# Patient Record
Sex: FEMALE | Race: BLACK OR AFRICAN AMERICAN | Employment: OTHER | URBAN - METROPOLITAN AREA
[De-identification: names, ages, dates, MRNs, and addresses within clinical notes are randomized per-mention and may not be internally consistent; named-entity substitution may affect disease eponyms.]

---

## 2017-01-24 RX ORDER — FLUCONAZOLE 100 MG/1
100 TABLET ORAL DAILY
Qty: 7 TABLET | Refills: 0 | Status: SHIPPED | OUTPATIENT
Start: 2017-01-24 | End: 2017-01-31

## 2017-01-24 RX ORDER — METRONIDAZOLE 500 MG/1
500 TABLET ORAL 2 TIMES DAILY
Qty: 14 TABLET | Refills: 0 | Status: SHIPPED | OUTPATIENT
Start: 2017-01-24 | End: 2017-01-31

## 2017-04-24 ENCOUNTER — HOSPITAL ENCOUNTER (OUTPATIENT)
Age: 52
Setting detail: SPECIMEN
Discharge: HOME | End: 2017-04-24
Payer: COMMERCIAL

## 2017-04-24 ENCOUNTER — OFFICE VISIT (OUTPATIENT)
Dept: OBGYN CLINIC | Age: 52
End: 2017-04-24
Payer: COMMERCIAL

## 2017-04-24 VITALS
DIASTOLIC BLOOD PRESSURE: 88 MMHG | SYSTOLIC BLOOD PRESSURE: 123 MMHG | HEIGHT: 64 IN | BODY MASS INDEX: 30.56 KG/M2 | HEART RATE: 85 BPM | RESPIRATION RATE: 18 BRPM | WEIGHT: 179 LBS

## 2017-04-24 DIAGNOSIS — E28.39 HYPOESTROGENISM: ICD-10-CM

## 2017-04-24 DIAGNOSIS — Z01.419 WELL WOMAN EXAM: Primary | ICD-10-CM

## 2017-04-24 DIAGNOSIS — N76.0 ACUTE VAGINITIS: ICD-10-CM

## 2017-04-24 DIAGNOSIS — Z12.31 VISIT FOR SCREENING MAMMOGRAM: ICD-10-CM

## 2017-04-24 DIAGNOSIS — Z12.4 PAP SMEAR FOR CERVICAL CANCER SCREENING: ICD-10-CM

## 2017-04-24 PROCEDURE — 99396 PREV VISIT EST AGE 40-64: CPT | Performed by: SPECIALIST

## 2017-04-24 RX ORDER — METRONIDAZOLE 500 MG/1
500 TABLET ORAL 2 TIMES DAILY
Qty: 14 TABLET | Refills: 0 | Status: SHIPPED | OUTPATIENT
Start: 2017-04-24 | End: 2017-05-01

## 2017-04-24 RX ORDER — FLUCONAZOLE 100 MG/1
100 TABLET ORAL DAILY
Qty: 7 TABLET | Refills: 0 | Status: SHIPPED | OUTPATIENT
Start: 2017-04-24 | End: 2017-05-01

## 2017-04-24 ASSESSMENT — ENCOUNTER SYMPTOMS
COUGH: 0
ABDOMINAL PAIN: 0
DIARRHEA: 0
VOMITING: 0
EYE PAIN: 0
ROS SKIN COMMENTS: HAIR LOSS
CONSTIPATION: 0
NAUSEA: 0
ABDOMINAL DISTENTION: 0
APNEA: 0

## 2017-04-27 LAB — CYTOLOGY REPORT: NORMAL

## 2017-07-20 ENCOUNTER — HOSPITAL ENCOUNTER (OUTPATIENT)
Age: 52
Discharge: HOME | End: 2017-07-20
Payer: COMMERCIAL

## 2017-07-20 ENCOUNTER — HOSPITAL ENCOUNTER (OUTPATIENT)
Dept: GENERAL RADIOLOGY | Age: 52
Discharge: HOME | End: 2017-07-20
Payer: COMMERCIAL

## 2017-07-20 ENCOUNTER — HOSPITAL ENCOUNTER (OUTPATIENT)
Dept: MAMMOGRAPHY | Age: 52
Discharge: HOME | End: 2017-07-20
Payer: COMMERCIAL

## 2017-07-20 DIAGNOSIS — R52 PAIN: ICD-10-CM

## 2017-07-20 DIAGNOSIS — Z12.31 VISIT FOR SCREENING MAMMOGRAM: ICD-10-CM

## 2017-07-20 LAB
ABSOLUTE EOS #: 0.2 K/UL (ref 0–0.4)
ABSOLUTE LYMPH #: 3.3 K/UL (ref 1–4.8)
ABSOLUTE MONO #: 0.6 K/UL (ref 0.2–0.8)
ALBUMIN SERPL-MCNC: 4.3 G/DL (ref 3.5–5.2)
ALBUMIN/GLOBULIN RATIO: ABNORMAL (ref 1–2.5)
ALP BLD-CCNC: 82 U/L (ref 35–104)
ALT SERPL-CCNC: 18 U/L (ref 5–33)
ANION GAP SERPL CALCULATED.3IONS-SCNC: 11 MMOL/L (ref 9–17)
AST SERPL-CCNC: 19 U/L
BASOPHILS # BLD: 1 %
BASOPHILS ABSOLUTE: 0.1 K/UL (ref 0–0.2)
BILIRUB SERPL-MCNC: 0.24 MG/DL (ref 0.3–1.2)
BUN BLDV-MCNC: 12 MG/DL (ref 6–20)
BUN/CREAT BLD: 14 (ref 9–20)
CALCIUM SERPL-MCNC: 9.6 MG/DL (ref 8.6–10.4)
CHLORIDE BLD-SCNC: 102 MMOL/L (ref 98–107)
CHOLESTEROL, FASTING: 243 MG/DL
CHOLESTEROL/HDL RATIO: 4.2
CO2: 30 MMOL/L (ref 20–31)
CREAT SERPL-MCNC: 0.85 MG/DL (ref 0.5–0.9)
DIFFERENTIAL TYPE: ABNORMAL
EOSINOPHILS RELATIVE PERCENT: 2 %
GFR AFRICAN AMERICAN: >60 ML/MIN
GFR NON-AFRICAN AMERICAN: >60 ML/MIN
GFR SERPL CREATININE-BSD FRML MDRD: ABNORMAL ML/MIN/{1.73_M2}
GFR SERPL CREATININE-BSD FRML MDRD: ABNORMAL ML/MIN/{1.73_M2}
GLUCOSE FASTING: 109 MG/DL (ref 70–99)
HCT VFR BLD CALC: 43.4 % (ref 36–46)
HDLC SERPL-MCNC: 58 MG/DL
HEMOGLOBIN: 14.1 G/DL (ref 12–16)
LDL CHOLESTEROL: 152 MG/DL (ref 0–130)
LYMPHOCYTES # BLD: 36 %
MCH RBC QN AUTO: 28.4 PG (ref 26–34)
MCHC RBC AUTO-ENTMCNC: 32.5 G/DL (ref 31–37)
MCV RBC AUTO: 87.5 FL (ref 80–100)
MONOCYTES # BLD: 7 %
PDW BLD-RTO: 15 % (ref 11.5–14.5)
PLATELET # BLD: 391 K/UL (ref 130–400)
PLATELET ESTIMATE: ABNORMAL
PMV BLD AUTO: 7.7 FL (ref 6–12)
POTASSIUM SERPL-SCNC: 3.9 MMOL/L (ref 3.7–5.3)
RBC # BLD: 4.96 M/UL (ref 4–5.2)
RBC # BLD: ABNORMAL 10*6/UL
SEG NEUTROPHILS: 54 %
SEGMENTED NEUTROPHILS ABSOLUTE COUNT: 4.9 K/UL (ref 1.8–7.7)
SODIUM BLD-SCNC: 143 MMOL/L (ref 135–144)
TOTAL PROTEIN: 7.3 G/DL (ref 6.4–8.3)
TRIGLYCERIDE, FASTING: 163 MG/DL
TSH SERPL DL<=0.05 MIU/L-ACNC: 1.33 MIU/L (ref 0.3–5)
VLDLC SERPL CALC-MCNC: ABNORMAL MG/DL (ref 1–30)
WBC # BLD: 9 K/UL (ref 3.5–11)
WBC # BLD: ABNORMAL 10*3/UL

## 2017-07-20 PROCEDURE — 77063 BREAST TOMOSYNTHESIS BI: CPT

## 2017-07-20 PROCEDURE — 72100 X-RAY EXAM L-S SPINE 2/3 VWS: CPT

## 2017-07-20 PROCEDURE — 80061 LIPID PANEL: CPT

## 2017-07-20 PROCEDURE — G0202 SCR MAMMO BI INCL CAD: HCPCS

## 2017-07-20 PROCEDURE — 84443 ASSAY THYROID STIM HORMONE: CPT

## 2017-07-20 PROCEDURE — 80053 COMPREHEN METABOLIC PANEL: CPT

## 2017-07-20 PROCEDURE — 85025 COMPLETE CBC W/AUTO DIFF WBC: CPT

## 2017-07-20 PROCEDURE — 36415 COLL VENOUS BLD VENIPUNCTURE: CPT

## 2017-08-10 ENCOUNTER — OFFICE VISIT (OUTPATIENT)
Dept: FAMILY MEDICINE CLINIC | Age: 52
End: 2017-08-10
Payer: COMMERCIAL

## 2017-08-10 VITALS
BODY MASS INDEX: 30.22 KG/M2 | WEIGHT: 177 LBS | HEIGHT: 64 IN | TEMPERATURE: 98.5 F | HEART RATE: 110 BPM | DIASTOLIC BLOOD PRESSURE: 80 MMHG | OXYGEN SATURATION: 98 % | SYSTOLIC BLOOD PRESSURE: 120 MMHG

## 2017-08-10 DIAGNOSIS — K21.9 GASTROESOPHAGEAL REFLUX DISEASE WITHOUT ESOPHAGITIS: ICD-10-CM

## 2017-08-10 DIAGNOSIS — I10 ESSENTIAL HYPERTENSION: Primary | ICD-10-CM

## 2017-08-10 DIAGNOSIS — E78.5 HYPERLIPIDEMIA, UNSPECIFIED HYPERLIPIDEMIA TYPE: ICD-10-CM

## 2017-08-10 DIAGNOSIS — F32.A DEPRESSION, UNSPECIFIED DEPRESSION TYPE: ICD-10-CM

## 2017-08-10 DIAGNOSIS — Z13.31 POSITIVE DEPRESSION SCREENING: ICD-10-CM

## 2017-08-10 PROCEDURE — 96127 BRIEF EMOTIONAL/BEHAV ASSMT: CPT | Performed by: FAMILY MEDICINE

## 2017-08-10 PROCEDURE — 99203 OFFICE O/P NEW LOW 30 MIN: CPT | Performed by: FAMILY MEDICINE

## 2017-08-10 PROCEDURE — G8431 POS CLIN DEPRES SCRN F/U DOC: HCPCS | Performed by: FAMILY MEDICINE

## 2017-08-10 RX ORDER — PAROXETINE HYDROCHLORIDE 25 MG/1
TABLET, FILM COATED, EXTENDED RELEASE ORAL
Refills: 0 | COMMUNITY
Start: 2017-07-06 | End: 2018-09-27 | Stop reason: SDUPTHER

## 2017-08-10 RX ORDER — OMEPRAZOLE 40 MG/1
40 CAPSULE, DELAYED RELEASE ORAL 2 TIMES DAILY
Refills: 0 | COMMUNITY
Start: 2017-07-24 | End: 2022-03-14

## 2017-08-10 RX ORDER — AMLODIPINE BESYLATE 10 MG/1
TABLET ORAL
Refills: 0 | COMMUNITY
Start: 2017-07-18 | End: 2018-02-05 | Stop reason: SDUPTHER

## 2017-08-10 RX ORDER — PRAVASTATIN SODIUM 20 MG
20 TABLET ORAL EVERY EVENING
Qty: 30 TABLET | Refills: 3 | Status: SHIPPED | OUTPATIENT
Start: 2017-08-10 | End: 2017-12-11 | Stop reason: SDUPTHER

## 2017-08-10 RX ORDER — MIRTAZAPINE 30 MG/1
TABLET, FILM COATED ORAL
Refills: 0 | COMMUNITY
Start: 2017-08-07 | End: 2018-04-25

## 2017-08-10 ASSESSMENT — ENCOUNTER SYMPTOMS
COUGH: 0
ABDOMINAL PAIN: 0
SORE THROAT: 0
SHORTNESS OF BREATH: 0
NAUSEA: 0

## 2017-08-10 ASSESSMENT — PATIENT HEALTH QUESTIONNAIRE - PHQ9
7. TROUBLE CONCENTRATING ON THINGS, SUCH AS READING THE NEWSPAPER OR WATCHING TELEVISION: 3
SUM OF ALL RESPONSES TO PHQ9 QUESTIONS 1 & 2: 4
2. FEELING DOWN, DEPRESSED OR HOPELESS: 2
3. TROUBLE FALLING OR STAYING ASLEEP: 3
5. POOR APPETITE OR OVEREATING: 3
4. FEELING TIRED OR HAVING LITTLE ENERGY: 3
1. LITTLE INTEREST OR PLEASURE IN DOING THINGS: 2
SUM OF ALL RESPONSES TO PHQ QUESTIONS 1-9: 21
10. IF YOU CHECKED OFF ANY PROBLEMS, HOW DIFFICULT HAVE THESE PROBLEMS MADE IT FOR YOU TO DO YOUR WORK, TAKE CARE OF THINGS AT HOME, OR GET ALONG WITH OTHER PEOPLE: 1
9. THOUGHTS THAT YOU WOULD BE BETTER OFF DEAD, OR OF HURTING YOURSELF: 0
6. FEELING BAD ABOUT YOURSELF - OR THAT YOU ARE A FAILURE OR HAVE LET YOURSELF OR YOUR FAMILY DOWN: 3
8. MOVING OR SPEAKING SO SLOWLY THAT OTHER PEOPLE COULD HAVE NOTICED. OR THE OPPOSITE, BEING SO FIGETY OR RESTLESS THAT YOU HAVE BEEN MOVING AROUND A LOT MORE THAN USUAL: 2

## 2017-10-03 ENCOUNTER — TELEPHONE (OUTPATIENT)
Dept: OBGYN CLINIC | Age: 52
End: 2017-10-03

## 2017-10-03 DIAGNOSIS — N89.8 VAGINA ITCHING: Primary | ICD-10-CM

## 2017-10-04 RX ORDER — FLUCONAZOLE 100 MG/1
100 TABLET ORAL DAILY
Qty: 7 TABLET | Refills: 0 | Status: SHIPPED | OUTPATIENT
Start: 2017-10-04 | End: 2017-10-11

## 2017-11-03 ENCOUNTER — HOSPITAL ENCOUNTER (OUTPATIENT)
Age: 52
Discharge: HOME | End: 2017-11-03
Payer: COMMERCIAL

## 2017-11-03 ENCOUNTER — HOSPITAL ENCOUNTER (OUTPATIENT)
Dept: GENERAL RADIOLOGY | Age: 52
Discharge: HOME | End: 2017-11-03
Payer: COMMERCIAL

## 2017-11-03 DIAGNOSIS — J44.9 OBSTRUCTIVE CHRONIC BRONCHITIS WITHOUT EXACERBATION (HCC): ICD-10-CM

## 2017-11-03 PROCEDURE — 71020 XR CHEST STANDARD TWO VW: CPT

## 2017-12-11 RX ORDER — PRAVASTATIN SODIUM 20 MG
20 TABLET ORAL EVERY EVENING
Qty: 30 TABLET | Refills: 2 | Status: SHIPPED | OUTPATIENT
Start: 2017-12-11 | End: 2018-01-26 | Stop reason: SINTOL

## 2017-12-19 ENCOUNTER — OFFICE VISIT (OUTPATIENT)
Dept: FAMILY MEDICINE CLINIC | Age: 52
End: 2017-12-19
Payer: COMMERCIAL

## 2017-12-19 VITALS
HEART RATE: 100 BPM | DIASTOLIC BLOOD PRESSURE: 80 MMHG | BODY MASS INDEX: 30.73 KG/M2 | HEIGHT: 64 IN | WEIGHT: 180 LBS | TEMPERATURE: 98.3 F | SYSTOLIC BLOOD PRESSURE: 118 MMHG | OXYGEN SATURATION: 92 %

## 2017-12-19 DIAGNOSIS — E55.9 VITAMIN D DEFICIENCY: ICD-10-CM

## 2017-12-19 DIAGNOSIS — E78.5 HYPERLIPIDEMIA, UNSPECIFIED HYPERLIPIDEMIA TYPE: ICD-10-CM

## 2017-12-19 DIAGNOSIS — I10 ESSENTIAL HYPERTENSION: Primary | ICD-10-CM

## 2017-12-19 DIAGNOSIS — R49.0 HOARSENESS: ICD-10-CM

## 2017-12-19 PROCEDURE — 3014F SCREEN MAMMO DOC REV: CPT | Performed by: FAMILY MEDICINE

## 2017-12-19 PROCEDURE — 3017F COLORECTAL CA SCREEN DOC REV: CPT | Performed by: FAMILY MEDICINE

## 2017-12-19 PROCEDURE — G8427 DOCREV CUR MEDS BY ELIG CLIN: HCPCS | Performed by: FAMILY MEDICINE

## 2017-12-19 PROCEDURE — 99213 OFFICE O/P EST LOW 20 MIN: CPT | Performed by: FAMILY MEDICINE

## 2017-12-19 PROCEDURE — G8484 FLU IMMUNIZE NO ADMIN: HCPCS | Performed by: FAMILY MEDICINE

## 2017-12-19 PROCEDURE — 4004F PT TOBACCO SCREEN RCVD TLK: CPT | Performed by: FAMILY MEDICINE

## 2017-12-19 PROCEDURE — G8417 CALC BMI ABV UP PARAM F/U: HCPCS | Performed by: FAMILY MEDICINE

## 2017-12-19 RX ORDER — TIZANIDINE 2 MG/1
TABLET ORAL
COMMUNITY
Start: 2017-11-10 | End: 2018-08-28 | Stop reason: ALTCHOICE

## 2017-12-19 ASSESSMENT — ENCOUNTER SYMPTOMS
VOICE CHANGE: 1
SHORTNESS OF BREATH: 0
CONSTIPATION: 0
ABDOMINAL PAIN: 0
SORE THROAT: 0
NAUSEA: 0
COUGH: 0
BACK PAIN: 0

## 2017-12-19 NOTE — PATIENT INSTRUCTIONS
Patient Education   Patient Education        Stopping Smoking: Care Instructions  Your Care Instructions    Cigarette smokers crave the nicotine in cigarettes. Giving it up is much harder than simply changing a habit. Your body has to stop craving the nicotine. It is hard to quit, but you can do it. There are many tools that people use to quit smoking. You may find that combining tools works best for you. There are several steps to quitting. First you get ready to quit. Then you get support to help you. After that, you learn new skills and behaviors to become a nonsmoker. For many people, a necessary step is getting and using medicine. Your doctor will help you set up the plan that best meets your needs. You may want to attend a smoking cessation program to help you quit smoking. When you choose a program, look for one that has proven success. Ask your doctor for ideas. You will greatly increase your chances of success if you take medicine as well as get counseling or join a cessation program.  Some of the changes you feel when you first quit tobacco are uncomfortable. Your body will miss the nicotine at first, and you may feel short-tempered and grumpy. You may have trouble sleeping or concentrating. Medicine can help you deal with these symptoms. You may struggle with changing your smoking habits and rituals. The last step is the tricky one: Be prepared for the smoking urge to continue for a time. This is a lot to deal with, but keep at it. You will feel better. Follow-up care is a key part of your treatment and safety. Be sure to make and go to all appointments, and call your doctor if you are having problems. It's also a good idea to know your test results and keep a list of the medicines you take. How can you care for yourself at home? · Ask your family, friends, and coworkers for support. You have a better chance of quitting if you have help and support.   · Join a support group, such as Nicotine Anonymous, for people who are trying to quit smoking. · Consider signing up for a smoking cessation program, such as the American Lung Association's Freedom from Smoking program.  · Set a quit date. Pick your date carefully so that it is not right in the middle of a big deadline or stressful time. Once you quit, do not even take a puff. Get rid of all ashtrays and lighters after your last cigarette. Clean your house and your clothes so that they do not smell of smoke. · Learn how to be a nonsmoker. Think about ways you can avoid those things that make you reach for a cigarette. ¨ Avoid situations that put you at greatest risk for smoking. For some people, it is hard to have a drink with friends without smoking. For others, they might skip a coffee break with coworkers who smoke. ¨ Change your daily routine. Take a different route to work or eat a meal in a different place. · Cut down on stress. Calm yourself or release tension by doing an activity you enjoy, such as reading a book, taking a hot bath, or gardening. · Talk to your doctor or pharmacist about nicotine replacement therapy, which replaces the nicotine in your body. You still get nicotine but you do not use tobacco. Nicotine replacement products help you slowly reduce the amount of nicotine you need. These products come in several forms, many of them available over-the-counter:  ¨ Nicotine patches  ¨ Nicotine gum and lozenges  ¨ Nicotine inhaler  · Ask your doctor about bupropion (Wellbutrin) or varenicline (Chantix), which are prescription medicines. They do not contain nicotine. They help you by reducing withdrawal symptoms, such as stress and anxiety. · Some people find hypnosis, acupuncture, and massage helpful for ending the smoking habit. · Eat a healthy diet and get regular exercise. Having healthy habits will help your body move past its craving for nicotine. · Be prepared to keep trying.  Most people are not successful the first few times they try to quit. Do not get mad at yourself if you smoke again. Make a list of things you learned and think about when you want to try again, such as next week, next month, or next year. Where can you learn more? Go to https://hyun.[employer]. org and sign in to your [employer] account. Enter W938 in the [facility] box to learn more about \"Stopping Smoking: Care Instructions. \"     If you do not have an account, please click on the \"Sign Up Now\" link. Current as of: March 20, 2017  Content Version: 11.4  © 4686-7923 [employer]. Care instructions adapted under license by [facility] ([facility]). If you have questions about a medical condition or this instruction, always ask your healthcare professional. [address] any warranty or liability for your use of this information. Learning About Benefits From Quitting Smoking  How does quitting smoking make you healthier? If you're thinking about quitting smoking, you may have a few reasons to be smoke-free. Your health may be one of them. · When you quit smoking, you lower your risks for cancer, lung disease, heart attack, stroke, blood vessel disease, and blindness from macular degeneration. · When you're smoke-free, you get sick less often, and you heal faster. You are less likely to get colds, flu, bronchitis, and pneumonia. · As a nonsmoker, you may find that your mood is better and you are less stressed. When and how will you feel healthier? Quitting has real health benefits that start from day 1 of being smoke-free. And the longer you stay smoke-free, the healthier you get and the better you feel. The first hours  · After just 20 minutes, your blood pressure and heart rate go down. That means there's less stress on your heart and blood vessels. · Within 12 hours, the level of carbon monoxide in your blood drops back to normal. That makes room for more oxygen.  With more oxygen in your body, you may

## 2017-12-19 NOTE — PROGRESS NOTES
Subjective:      Patient ID: Ciro Nayak is a 45 y.o. female. Chronic Disease Visit Information    BP Readings from Last 3 Encounters:   12/19/17 118/80   08/10/17 120/80   04/24/17 123/88          Hemoglobin A1C (%)   Date Value   11/11/2013 5.9     LDL Cholesterol (mg/dL)   Date Value   07/20/2017 152 (H)     HDL (mg/dL)   Date Value   07/20/2017 58     BUN (mg/dL)   Date Value   07/20/2017 12     CREATININE (mg/dL)   Date Value   07/20/2017 0.85     Glucose (mg/dL)   Date Value   06/05/2015 86   05/01/2012 93            Have you changed or started any medications since your last visit including any over-the-counter medicines, vitamins, or herbal medicines? no   Are you having any side effects from any of your medications? -  no  Have you stopped taking any of your medications? Is so, why? -  no    Have you seen any other physician or provider since your last visit? Yes - Records Obtained  Have you had any other diagnostic tests since your last visit? Yes - Records Requested  Have you been seen in the emergency room and/or had an admission to a hospital since we last saw you? No  Have you had your annual diabetic retinal (eye) exam? No  Have you had your routine dental cleaning in the past 6 months? yes - unsure date     Have you activated your [employer] account? If not, what are your barriers?  Yes     Patient Care Team:  Chris Washington MD as PCP - General (Family Medicine)  Nimisha De Los Santos MD as Consulting Physician (Orthopedic Surgery)         Medical History Review  Past Medical, Family, and Social History reviewed and does contribute to the patient presenting condition    Health Maintenance   Topic Date Due    DTaP/Tdap/Td vaccine (1 - Tdap) 12/18/1982    Pneumococcal med risk (1 of 1 - PPSV23) 12/18/1982    Colon cancer screen colonoscopy  12/18/2013    Flu vaccine (1) 09/01/2017    Breast cancer screen  07/20/2019    Cervical cancer screen  04/24/2020    Lipid screen  07/20/2022    Hepatitis C place, and time. Skin: No rash noted. Nursing note and vitals reviewed. Assessment:      1. Essential hypertension  Controlled  Comprehensive Metabolic Panel   2. Hyperlipidemia, unspecified hyperlipidemia type  Lipid Panel   3. Vitamin D deficiency  Vitamin D 25 Hydroxy   4. Hoarseness  AARTI Morales MD          Plan:        Orders Placed This Encounter   Procedures    Lipid Panel     Standing Status:   Future     Standing Expiration Date:   12/19/2018     Order Specific Question:   Is Patient Fasting?/# of Hours     Answer:   12    Comprehensive Metabolic Panel     Standing Status:   Future     Standing Expiration Date:   12/19/2018    Vitamin D 25 Hydroxy     Standing Status:   Future     Standing Expiration Date:   12/19/2018   Holly Bertosamantha Morales MD     Referral Priority:   Routine     Referral Type:   Consult for Advice and Opinion     Referral Reason:   Specialty Services Required     Referred to Provider:   Brittany Crowley MD     Requested Specialty:   Otolaryngology     Number of Visits Requested:   1     No orders of the defined types were placed in this encounter. Return in about 1 month (around 1/19/2018) for HTN.     Continue current medications reviewed from the chart

## 2018-01-05 ENCOUNTER — HOSPITAL ENCOUNTER (OUTPATIENT)
Age: 53
Discharge: HOME | End: 2018-01-05
Payer: COMMERCIAL

## 2018-01-05 DIAGNOSIS — I10 ESSENTIAL HYPERTENSION: ICD-10-CM

## 2018-01-05 DIAGNOSIS — E78.5 HYPERLIPIDEMIA, UNSPECIFIED HYPERLIPIDEMIA TYPE: ICD-10-CM

## 2018-01-05 DIAGNOSIS — E55.9 VITAMIN D DEFICIENCY: ICD-10-CM

## 2018-01-05 LAB
ALBUMIN SERPL-MCNC: 4.4 G/DL (ref 3.5–5.2)
ALBUMIN/GLOBULIN RATIO: ABNORMAL (ref 1–2.5)
ALP BLD-CCNC: 86 U/L (ref 35–104)
ALT SERPL-CCNC: 20 U/L (ref 5–33)
ANION GAP SERPL CALCULATED.3IONS-SCNC: 11 MMOL/L (ref 9–17)
AST SERPL-CCNC: 19 U/L
BILIRUB SERPL-MCNC: 0.42 MG/DL (ref 0.3–1.2)
BUN BLDV-MCNC: 8 MG/DL (ref 6–20)
BUN/CREAT BLD: 11 (ref 9–20)
CALCIUM SERPL-MCNC: 9.4 MG/DL (ref 8.6–10.4)
CHLORIDE BLD-SCNC: 102 MMOL/L (ref 98–107)
CHOLESTEROL/HDL RATIO: 3.2
CHOLESTEROL: 188 MG/DL
CO2: 31 MMOL/L (ref 20–31)
CREAT SERPL-MCNC: 0.73 MG/DL (ref 0.5–0.9)
GFR AFRICAN AMERICAN: >60 ML/MIN
GFR NON-AFRICAN AMERICAN: >60 ML/MIN
GFR SERPL CREATININE-BSD FRML MDRD: ABNORMAL ML/MIN/{1.73_M2}
GFR SERPL CREATININE-BSD FRML MDRD: ABNORMAL ML/MIN/{1.73_M2}
GLUCOSE BLD-MCNC: 118 MG/DL (ref 70–99)
HDLC SERPL-MCNC: 59 MG/DL
LDL CHOLESTEROL: 110 MG/DL (ref 0–130)
POTASSIUM SERPL-SCNC: 3.6 MMOL/L (ref 3.7–5.3)
SODIUM BLD-SCNC: 144 MMOL/L (ref 135–144)
TOTAL PROTEIN: 7.2 G/DL (ref 6.4–8.3)
TRIGL SERPL-MCNC: 93 MG/DL
VITAMIN D 25-HYDROXY: 26.9 NG/ML (ref 30–100)
VLDLC SERPL CALC-MCNC: NORMAL MG/DL (ref 1–30)

## 2018-01-05 PROCEDURE — 36415 COLL VENOUS BLD VENIPUNCTURE: CPT

## 2018-01-05 PROCEDURE — 80053 COMPREHEN METABOLIC PANEL: CPT

## 2018-01-05 PROCEDURE — 80061 LIPID PANEL: CPT

## 2018-01-05 PROCEDURE — 82306 VITAMIN D 25 HYDROXY: CPT

## 2018-01-08 RX ORDER — POTASSIUM CHLORIDE 20 MEQ/1
20 TABLET, EXTENDED RELEASE ORAL DAILY
Qty: 3 TABLET | Refills: 0 | Status: SHIPPED | OUTPATIENT
Start: 2018-01-08 | End: 2018-01-08

## 2018-01-18 ENCOUNTER — OFFICE VISIT (OUTPATIENT)
Dept: FAMILY MEDICINE CLINIC | Age: 53
End: 2018-01-18
Payer: COMMERCIAL

## 2018-01-18 VITALS
WEIGHT: 179 LBS | TEMPERATURE: 98.9 F | DIASTOLIC BLOOD PRESSURE: 82 MMHG | SYSTOLIC BLOOD PRESSURE: 126 MMHG | HEART RATE: 100 BPM | OXYGEN SATURATION: 99 % | BODY MASS INDEX: 30.73 KG/M2

## 2018-01-18 DIAGNOSIS — I10 ESSENTIAL HYPERTENSION: ICD-10-CM

## 2018-01-18 DIAGNOSIS — E87.6 HYPOKALEMIA: ICD-10-CM

## 2018-01-18 DIAGNOSIS — M79.10 MYALGIA: Primary | ICD-10-CM

## 2018-01-18 DIAGNOSIS — K21.9 GASTROESOPHAGEAL REFLUX DISEASE WITHOUT ESOPHAGITIS: ICD-10-CM

## 2018-01-18 PROCEDURE — G8427 DOCREV CUR MEDS BY ELIG CLIN: HCPCS | Performed by: FAMILY MEDICINE

## 2018-01-18 PROCEDURE — G8484 FLU IMMUNIZE NO ADMIN: HCPCS | Performed by: FAMILY MEDICINE

## 2018-01-18 PROCEDURE — G8417 CALC BMI ABV UP PARAM F/U: HCPCS | Performed by: FAMILY MEDICINE

## 2018-01-18 PROCEDURE — 99213 OFFICE O/P EST LOW 20 MIN: CPT | Performed by: FAMILY MEDICINE

## 2018-01-18 PROCEDURE — 3017F COLORECTAL CA SCREEN DOC REV: CPT | Performed by: FAMILY MEDICINE

## 2018-01-18 PROCEDURE — 4004F PT TOBACCO SCREEN RCVD TLK: CPT | Performed by: FAMILY MEDICINE

## 2018-01-18 PROCEDURE — 3014F SCREEN MAMMO DOC REV: CPT | Performed by: FAMILY MEDICINE

## 2018-01-18 ASSESSMENT — ENCOUNTER SYMPTOMS
NAUSEA: 0
ABDOMINAL PAIN: 0
CONSTIPATION: 0
SHORTNESS OF BREATH: 0
SORE THROAT: 0

## 2018-01-18 NOTE — PROGRESS NOTES
Subjective:      Patient ID: Sanjuan Apgar is a 45 y.o. female. Muscle cramps  Visit Information  Have you stopped taking any of your medications? Is so, why? -  yes - Trazadone  Are you having any side effects from any of your medications? - no    Have you seen any other physician or provider since your last visit?  no   Have you had any other diagnostic tests since your last visit?  no   Have you been seen in the emergency room and/or had an admission in a hospital since we last saw you?  no   Have you had your routine dental cleaning in the past 6 months?  yes      Do you have an active MyChart account? If no, what is the barrier? Yes    Patient Care Team:  Sesar Joshi MD as PCP - General (Family Medicine)  Hui Peter MD as Consulting Physician (Orthopedic Surgery)    Medical History Review  Past Medical, Family, and Social History reviewed and does contribute to the patient presenting condition    Health Maintenance   Topic Date Due    DTaP/Tdap/Td vaccine (1 - Tdap) 1982    Pneumococcal med risk (1 of 1 - PPSV23) 1982    Colon cancer screen colonoscopy  2013    A1C test (Diabetic or Prediabetic)  2014    Flu vaccine (1) 2017    Breast cancer screen  2019    Cervical cancer screen  2020    Lipid screen  2023    Hepatitis C screen  Completed    HIV screen  Completed             HPI   49-year-old female is seen in the Office Today complaining that she has muscle aches in her legs abdomen arms and I told her it may be from more pravastatin she states she went off of pravastatin for some time the muscle aches did not get  better so she is concerned I told her we'll send her to a rheumatologist she also has got hypertension on Norvasc and blood pressure is controlled has hyperlipidemia and is on pravastatin  Review of Systems   Constitutional: Negative for appetite change. HENT: Negative for ear pain and sore throat.     Eyes: Negative for visual disturbance. Respiratory: Negative for shortness of breath. Cardiovascular: Negative for chest pain and leg swelling. Gastrointestinal: Negative for abdominal pain, constipation and nausea. Genitourinary: Negative for frequency and pelvic pain. Musculoskeletal: Positive for myalgias. Negative for arthralgias. Neurological: Negative for dizziness and headaches. Objective:   Physical Exam   Constitutional: She is oriented to person, place, and time. She appears well-developed and well-nourished. /82 (Site: Left Arm, Position: Sitting, Cuff Size: Small Adult)   Pulse 100   Temp 98.9 °F (37.2 °C) (Oral)   Wt 179 lb (81.2 kg)   LMP 07/10/2012   SpO2 99%   BMI 30.73 kg/m²    HENT:   Head: Normocephalic. Mouth/Throat: Oropharynx is clear and moist.        Cardiovascular: Normal rate and regular rhythm. Pulmonary/Chest: Breath sounds normal.   Abdominal: Soft. Bowel sounds are normal. There is no tenderness. Musculoskeletal: She exhibits no edema or tenderness. Lymphadenopathy:     She has no cervical adenopathy. Neurological: She is alert and oriented to person, place, and time. Nursing note and vitals reviewed. Assessment:      1. Myalgia  External Referral To Rheumatology   2. Hypokalemia  Potassium   3. Gastroesophageal reflux disease without esophagitis  Controlled   4. Essential hypertension  controlled           Plan:         Orders Placed This Encounter   Procedures    Potassium     Standing Status:   Future     Standing Expiration Date:   1/18/2019    External Referral To Rheumatology     Referral Priority:   Routine     Referral Type:   Consult for Advice and Opinion     Referral Reason:   Specialty Services Required     Referred to Provider: Aftab Guy MD     Requested Specialty:   Internal Med Rheumatology     Number of Visits Requested:   1     No orders of the defined types were placed in this encounter.     Return in about 3 months (around 4/18/2018) for HTN.    Continue current medications reviewed from the chart

## 2018-01-22 ENCOUNTER — TELEPHONE (OUTPATIENT)
Dept: FAMILY MEDICINE CLINIC | Age: 53
End: 2018-01-22

## 2018-01-26 ENCOUNTER — TELEPHONE (OUTPATIENT)
Dept: FAMILY MEDICINE CLINIC | Age: 53
End: 2018-01-26

## 2018-01-26 NOTE — TELEPHONE ENCOUNTER
Patient states that since she has stopped taking the pravastatin she feels so much better. Patient states she is still going to keep her appointment. She just wanted to call in and tell you thank you for listening to her.

## 2018-02-05 RX ORDER — AMLODIPINE BESYLATE 10 MG/1
TABLET ORAL
Qty: 30 TABLET | Refills: 2 | Status: SHIPPED | OUTPATIENT
Start: 2018-02-05 | End: 2018-04-26 | Stop reason: SDUPTHER

## 2018-03-05 RX ORDER — PRAVASTATIN SODIUM 20 MG
20 TABLET ORAL EVERY EVENING
Qty: 30 TABLET | Refills: 2 | Status: SHIPPED | OUTPATIENT
Start: 2018-03-05 | End: 2018-04-25

## 2018-04-25 ENCOUNTER — HOSPITAL ENCOUNTER (OUTPATIENT)
Age: 53
Setting detail: SPECIMEN
Discharge: HOME | End: 2018-04-25
Payer: COMMERCIAL

## 2018-04-25 ENCOUNTER — OFFICE VISIT (OUTPATIENT)
Dept: OBGYN CLINIC | Age: 53
End: 2018-04-25
Payer: COMMERCIAL

## 2018-04-25 VITALS
BODY MASS INDEX: 30.21 KG/M2 | HEART RATE: 85 BPM | SYSTOLIC BLOOD PRESSURE: 121 MMHG | WEIGHT: 176 LBS | DIASTOLIC BLOOD PRESSURE: 87 MMHG | RESPIRATION RATE: 18 BRPM

## 2018-04-25 DIAGNOSIS — Z01.419 WELL WOMAN EXAM: Primary | ICD-10-CM

## 2018-04-25 DIAGNOSIS — L02.429 BOIL, THIGH: ICD-10-CM

## 2018-04-25 DIAGNOSIS — Z12.31 VISIT FOR SCREENING MAMMOGRAM: ICD-10-CM

## 2018-04-25 DIAGNOSIS — Z72.51 HIGH RISK SEXUAL BEHAVIOR: ICD-10-CM

## 2018-04-25 DIAGNOSIS — Z12.4 PAP SMEAR FOR CERVICAL CANCER SCREENING: ICD-10-CM

## 2018-04-25 DIAGNOSIS — N76.0 ACUTE VAGINITIS: ICD-10-CM

## 2018-04-25 LAB
HIV AG/AB: NONREACTIVE
IGG: 821 MG/DL (ref 700–1600)
IGM: 47 MG/DL (ref 40–230)

## 2018-04-25 PROCEDURE — G0101 CA SCREEN;PELVIC/BREAST EXAM: HCPCS | Performed by: SPECIALIST

## 2018-04-25 RX ORDER — METRONIDAZOLE 500 MG/1
500 TABLET ORAL 2 TIMES DAILY
Qty: 14 TABLET | Refills: 0 | Status: SHIPPED | OUTPATIENT
Start: 2018-04-25 | End: 2018-05-02

## 2018-04-25 RX ORDER — FLUCONAZOLE 100 MG/1
100 TABLET ORAL DAILY
Qty: 7 TABLET | Refills: 0 | Status: SHIPPED | OUTPATIENT
Start: 2018-04-25 | End: 2018-05-02

## 2018-04-25 RX ORDER — CEPHALEXIN 500 MG/1
500 CAPSULE ORAL 4 TIMES DAILY
Qty: 24 CAPSULE | Refills: 0 | Status: SHIPPED | OUTPATIENT
Start: 2018-04-25 | End: 2018-08-28 | Stop reason: ALTCHOICE

## 2018-04-26 ENCOUNTER — OFFICE VISIT (OUTPATIENT)
Dept: FAMILY MEDICINE CLINIC | Age: 53
End: 2018-04-26
Payer: COMMERCIAL

## 2018-04-26 VITALS
HEART RATE: 70 BPM | SYSTOLIC BLOOD PRESSURE: 130 MMHG | WEIGHT: 176 LBS | BODY MASS INDEX: 30.21 KG/M2 | OXYGEN SATURATION: 98 % | TEMPERATURE: 98 F | DIASTOLIC BLOOD PRESSURE: 80 MMHG | RESPIRATION RATE: 18 BRPM

## 2018-04-26 DIAGNOSIS — I10 ESSENTIAL HYPERTENSION: Primary | ICD-10-CM

## 2018-04-26 DIAGNOSIS — E78.5 HYPERLIPIDEMIA, UNSPECIFIED HYPERLIPIDEMIA TYPE: ICD-10-CM

## 2018-04-26 LAB
HERPES SIMPLEX VIRUS 1 IGG: 0.7
HERPES SIMPLEX VIRUS 2 IGG: 7.07
HERPES TYPE 1/2 IGM COMBINED: 0.51

## 2018-04-26 PROCEDURE — 99213 OFFICE O/P EST LOW 20 MIN: CPT | Performed by: FAMILY MEDICINE

## 2018-04-26 PROCEDURE — 3017F COLORECTAL CA SCREEN DOC REV: CPT | Performed by: FAMILY MEDICINE

## 2018-04-26 PROCEDURE — G8427 DOCREV CUR MEDS BY ELIG CLIN: HCPCS | Performed by: FAMILY MEDICINE

## 2018-04-26 PROCEDURE — G8417 CALC BMI ABV UP PARAM F/U: HCPCS | Performed by: FAMILY MEDICINE

## 2018-04-26 PROCEDURE — 4004F PT TOBACCO SCREEN RCVD TLK: CPT | Performed by: FAMILY MEDICINE

## 2018-04-26 RX ORDER — AMLODIPINE BESYLATE 10 MG/1
TABLET ORAL
Qty: 30 TABLET | Refills: 2 | Status: SHIPPED | OUTPATIENT
Start: 2018-04-26 | End: 2018-07-21 | Stop reason: SDUPTHER

## 2018-04-26 ASSESSMENT — ENCOUNTER SYMPTOMS
SHORTNESS OF BREATH: 0
NAUSEA: 0
ABDOMINAL PAIN: 0
SORE THROAT: 0
CONSTIPATION: 0

## 2018-04-27 LAB
CHLAMYDIA BY THIN PREP: NEGATIVE
N. GONORRHOEAE DNA, THIN PREP: NEGATIVE

## 2018-05-01 ENCOUNTER — TELEPHONE (OUTPATIENT)
Dept: OBGYN CLINIC | Age: 53
End: 2018-05-01

## 2018-05-02 RX ORDER — VALACYCLOVIR HYDROCHLORIDE 500 MG/1
500 TABLET, FILM COATED ORAL 2 TIMES DAILY
Qty: 30 TABLET | Refills: 0 | Status: SHIPPED | OUTPATIENT
Start: 2018-05-02 | End: 2018-10-09 | Stop reason: SDUPTHER

## 2018-05-05 ASSESSMENT — ENCOUNTER SYMPTOMS
CONSTIPATION: 0
ABDOMINAL PAIN: 0
DIARRHEA: 0
APNEA: 0
NAUSEA: 0
ABDOMINAL DISTENTION: 0
VOMITING: 0
EYE PAIN: 0
COUGH: 0

## 2018-05-07 ENCOUNTER — TELEPHONE (OUTPATIENT)
Dept: FAMILY MEDICINE CLINIC | Age: 53
End: 2018-05-07

## 2018-05-23 LAB
CYTOLOGY REPORT: NORMAL
HPV SAMPLE: NORMAL
HPV SOURCE: NORMAL
HPV, GENOTYPE 16: NOT DETECTED
HPV, GENOTYPE 18: NOT DETECTED
HPV, HIGH RISK OTHER: NOT DETECTED
HPV, INTERPRETATION: NORMAL

## 2018-05-31 ENCOUNTER — TELEPHONE (OUTPATIENT)
Dept: OBGYN CLINIC | Age: 53
End: 2018-05-31

## 2018-05-31 RX ORDER — FLUCONAZOLE 100 MG/1
100 TABLET ORAL DAILY
Qty: 7 TABLET | Refills: 0 | Status: SHIPPED | OUTPATIENT
Start: 2018-05-31 | End: 2018-06-07

## 2018-05-31 RX ORDER — METRONIDAZOLE 500 MG/1
500 TABLET ORAL 2 TIMES DAILY
Qty: 14 TABLET | Refills: 0 | Status: SHIPPED | OUTPATIENT
Start: 2018-05-31 | End: 2018-06-07

## 2018-06-13 ENCOUNTER — TELEPHONE (OUTPATIENT)
Dept: OBGYN CLINIC | Age: 53
End: 2018-06-13

## 2018-06-13 RX ORDER — FLUCONAZOLE 100 MG/1
100 TABLET ORAL DAILY
Qty: 7 TABLET | Refills: 0 | Status: SHIPPED | OUTPATIENT
Start: 2018-06-13 | End: 2018-06-20

## 2018-06-13 RX ORDER — CEPHALEXIN 500 MG/1
500 CAPSULE ORAL 4 TIMES DAILY
Qty: 28 CAPSULE | Refills: 0 | Status: SHIPPED | OUTPATIENT
Start: 2018-06-13 | End: 2018-08-28 | Stop reason: ALTCHOICE

## 2018-07-23 RX ORDER — AMLODIPINE BESYLATE 10 MG/1
TABLET ORAL
Qty: 30 TABLET | Refills: 0 | Status: SHIPPED | OUTPATIENT
Start: 2018-07-23 | End: 2018-08-28 | Stop reason: SDUPTHER

## 2018-08-16 ENCOUNTER — OFFICE VISIT (OUTPATIENT)
Dept: ORTHOPEDIC SURGERY | Age: 53
End: 2018-08-16
Payer: COMMERCIAL

## 2018-08-16 DIAGNOSIS — M54.2 CERVICALGIA: Primary | ICD-10-CM

## 2018-08-16 DIAGNOSIS — M54.12 BRACHIAL NEURITIS OR RADICULITIS: ICD-10-CM

## 2018-08-16 DIAGNOSIS — M50.30 DEGENERATION OF CERVICAL INTERVERTEBRAL DISC: ICD-10-CM

## 2018-08-16 PROCEDURE — 4004F PT TOBACCO SCREEN RCVD TLK: CPT | Performed by: ORTHOPAEDIC SURGERY

## 2018-08-16 PROCEDURE — 3017F COLORECTAL CA SCREEN DOC REV: CPT | Performed by: ORTHOPAEDIC SURGERY

## 2018-08-16 PROCEDURE — G8417 CALC BMI ABV UP PARAM F/U: HCPCS | Performed by: ORTHOPAEDIC SURGERY

## 2018-08-16 PROCEDURE — 99203 OFFICE O/P NEW LOW 30 MIN: CPT | Performed by: ORTHOPAEDIC SURGERY

## 2018-08-16 PROCEDURE — G8427 DOCREV CUR MEDS BY ELIG CLIN: HCPCS | Performed by: ORTHOPAEDIC SURGERY

## 2018-08-16 RX ORDER — CYCLOBENZAPRINE HCL 10 MG
10 TABLET ORAL 3 TIMES DAILY PRN
Qty: 90 TABLET | Refills: 0 | Status: SHIPPED | OUTPATIENT
Start: 2018-08-16 | End: 2018-08-26

## 2018-08-22 ENCOUNTER — HOSPITAL ENCOUNTER (OUTPATIENT)
Dept: PHYSICAL THERAPY | Age: 53
Setting detail: THERAPIES SERIES
Discharge: HOME | End: 2018-08-22
Payer: COMMERCIAL

## 2018-08-22 PROCEDURE — G8981 BODY POS CURRENT STATUS: HCPCS

## 2018-08-22 PROCEDURE — 97162 PT EVAL MOD COMPLEX 30 MIN: CPT

## 2018-08-22 PROCEDURE — 97161 PT EVAL LOW COMPLEX 20 MIN: CPT

## 2018-08-22 PROCEDURE — G8982 BODY POS GOAL STATUS: HCPCS

## 2018-08-22 NOTE — PROGRESS NOTES
[address] [facility]  [address] [facility]  [address]         Phone: [phone]       Fax: [phone]     Physical Therapy Spine Evaluation    Date:  2018  Patient: Shahid Reid  : 1965  MRN: [id number]  Physician: Neema Lebron MD  Insurance:[facility]   Medical Diagnosis:  M54.2 (ICD-10-CM) - Cervicalgia   M54.12 (ICD-10-CM) - Brachial neuritis or radiculitis   M50.30 (ICD-10-CM) - Degeneration of cervical intervertebral disc      Rehab Codes: Postural abn, MM Weakness   Onset Date: 7 yrs ago      Subjective:   CC:Constant burning and numbness unable to find positions and movements of relief except shift  with SB R .N&T down L UE including fingers  HPI: (onset date) Cervical fusion C5-C6 5-6yrs ago    PMHx: [x] Unremarkable [] Diabetes [x] HTN  [] Pacemaker   [] MI/Heart Problems [] Cancer [] Arthritis [x] Other: Panic attack, anxiety              [] Refer to full medical chart  In EPIC   Tests: [] X-Ray: [] MRI:  [] Other:    Medications: [x] Refer to full medical record [] None [] Other:  Allergies:      [x] Refer to full medical record [] None [] Other:    Function:  Hand Dominance  [x] Right  [] Left  Working:  [] Normal Duty  [] Light Duty  [] Off D/T Condition  [] Retired  [x] Not Employed                  []  Disability  [] Other:           Return to work:   Job/ADL Description:  Pain:  [] Yes  [x] No Location: neck and L UE Pain Rating: (0-10 scale) 9/10  Pain altered Tx:  [] Yes  [] No  Action:  Symptoms:  [] Improving [x] Worsening [] Same   Better:  [] AM    [] PM    [] Sit    [] Rise/Sit    []Stand    [] Walk    [] Lying    [] Other:SB R  Worse: [] AM    [] PM    [] Sit    [] Rise/Sit    []Stand    [] Walk    [] Lying    [x] Bend  To L                           [] Valsalva    [] Other:  Sleep: [] OK    [x] Disturbed    Objective:      STRENGTH  STRENGTH  ROM    Left Right  Left Right Cervical    C5 Shld Abd   L1-2 Hip Flex   Flexion    Shld

## 2018-08-28 ENCOUNTER — OFFICE VISIT (OUTPATIENT)
Dept: FAMILY MEDICINE CLINIC | Age: 53
End: 2018-08-28
Payer: COMMERCIAL

## 2018-08-28 VITALS
BODY MASS INDEX: 28.17 KG/M2 | OXYGEN SATURATION: 99 % | HEART RATE: 82 BPM | HEIGHT: 64 IN | WEIGHT: 165 LBS | DIASTOLIC BLOOD PRESSURE: 70 MMHG | SYSTOLIC BLOOD PRESSURE: 100 MMHG | TEMPERATURE: 98.5 F

## 2018-08-28 DIAGNOSIS — R73.03 PREDIABETES: ICD-10-CM

## 2018-08-28 DIAGNOSIS — R73.9 ELEVATED BLOOD SUGAR: ICD-10-CM

## 2018-08-28 DIAGNOSIS — E78.5 HYPERLIPIDEMIA, UNSPECIFIED HYPERLIPIDEMIA TYPE: ICD-10-CM

## 2018-08-28 DIAGNOSIS — I10 ESSENTIAL HYPERTENSION: Primary | ICD-10-CM

## 2018-08-28 LAB — HBA1C MFR BLD: 6.1 %

## 2018-08-28 PROCEDURE — G8427 DOCREV CUR MEDS BY ELIG CLIN: HCPCS | Performed by: FAMILY MEDICINE

## 2018-08-28 PROCEDURE — 99214 OFFICE O/P EST MOD 30 MIN: CPT | Performed by: FAMILY MEDICINE

## 2018-08-28 PROCEDURE — 3017F COLORECTAL CA SCREEN DOC REV: CPT | Performed by: FAMILY MEDICINE

## 2018-08-28 PROCEDURE — G8417 CALC BMI ABV UP PARAM F/U: HCPCS | Performed by: FAMILY MEDICINE

## 2018-08-28 PROCEDURE — 4004F PT TOBACCO SCREEN RCVD TLK: CPT | Performed by: FAMILY MEDICINE

## 2018-08-28 PROCEDURE — 83036 HEMOGLOBIN GLYCOSYLATED A1C: CPT | Performed by: FAMILY MEDICINE

## 2018-08-28 RX ORDER — AMLODIPINE BESYLATE 10 MG/1
TABLET ORAL
Qty: 30 TABLET | Refills: 2 | Status: SHIPPED | OUTPATIENT
Start: 2018-08-28 | End: 2018-12-10

## 2018-08-28 RX ORDER — AMLODIPINE BESYLATE 10 MG/1
TABLET ORAL
Qty: 30 TABLET | Refills: 2 | Status: SHIPPED | OUTPATIENT
Start: 2018-08-28 | End: 2018-08-28 | Stop reason: SDUPTHER

## 2018-08-28 RX ORDER — CYCLOBENZAPRINE HCL 10 MG
10 TABLET ORAL 3 TIMES DAILY PRN
COMMUNITY

## 2018-08-28 ASSESSMENT — PATIENT HEALTH QUESTIONNAIRE - PHQ9
SUM OF ALL RESPONSES TO PHQ QUESTIONS 1-9: 2
1. LITTLE INTEREST OR PLEASURE IN DOING THINGS: 1
SUM OF ALL RESPONSES TO PHQ QUESTIONS 1-9: 2
SUM OF ALL RESPONSES TO PHQ9 QUESTIONS 1 & 2: 2
2. FEELING DOWN, DEPRESSED OR HOPELESS: 1

## 2018-08-28 ASSESSMENT — ENCOUNTER SYMPTOMS
ABDOMINAL PAIN: 0
CONSTIPATION: 0
SORE THROAT: 0
SHORTNESS OF BREATH: 0
NAUSEA: 0

## 2018-08-30 ENCOUNTER — HOSPITAL ENCOUNTER (OUTPATIENT)
Dept: PAIN MANAGEMENT | Age: 53
Discharge: HOME | End: 2018-08-30
Payer: COMMERCIAL

## 2018-08-30 VITALS
RESPIRATION RATE: 17 BRPM | OXYGEN SATURATION: 99 % | BODY MASS INDEX: 28.17 KG/M2 | SYSTOLIC BLOOD PRESSURE: 124 MMHG | WEIGHT: 165 LBS | TEMPERATURE: 98.9 F | HEART RATE: 99 BPM | HEIGHT: 64 IN | DIASTOLIC BLOOD PRESSURE: 80 MMHG

## 2018-08-30 DIAGNOSIS — M54.12 BRACHIAL NEURITIS OR RADICULITIS: Primary | ICD-10-CM

## 2018-08-30 PROCEDURE — 99203 OFFICE O/P NEW LOW 30 MIN: CPT

## 2018-08-30 PROCEDURE — 99203 OFFICE O/P NEW LOW 30 MIN: CPT | Performed by: ANESTHESIOLOGY

## 2018-08-30 RX ORDER — TRAMADOL HYDROCHLORIDE 50 MG/1
50 TABLET ORAL EVERY 8 HOURS PRN
Qty: 90 TABLET | Refills: 0 | Status: SHIPPED | OUTPATIENT
Start: 2018-08-30 | End: 2018-12-10

## 2018-08-30 RX ORDER — FLUCONAZOLE 200 MG/1
200 TABLET ORAL DAILY
COMMUNITY
End: 2018-12-10 | Stop reason: ALTCHOICE

## 2018-08-30 RX ORDER — TRAZODONE HYDROCHLORIDE 50 MG/1
50 TABLET ORAL NIGHTLY
COMMUNITY

## 2018-08-30 ASSESSMENT — PAIN SCALES - GENERAL: PAINLEVEL_OUTOF10: 7

## 2018-08-30 ASSESSMENT — ENCOUNTER SYMPTOMS
NAUSEA: 0
VOMITING: 0
BLURRED VISION: 0
SORE THROAT: 0
SHORTNESS OF BREATH: 0
COUGH: 0
BACK PAIN: 1
RESPIRATORY NEGATIVE: 1
EYES NEGATIVE: 1
HEARTBURN: 1

## 2018-08-30 ASSESSMENT — PAIN DESCRIPTION - PROGRESSION: CLINICAL_PROGRESSION: GRADUALLY WORSENING

## 2018-08-30 ASSESSMENT — PAIN DESCRIPTION - ORIENTATION: ORIENTATION: LEFT

## 2018-08-30 ASSESSMENT — PAIN DESCRIPTION - DESCRIPTORS: DESCRIPTORS: CONSTANT

## 2018-08-30 ASSESSMENT — PAIN DESCRIPTION - ONSET: ONSET: ON-GOING

## 2018-08-30 ASSESSMENT — PAIN DESCRIPTION - PAIN TYPE: TYPE: ACUTE PAIN;CHRONIC PAIN

## 2018-08-30 ASSESSMENT — PAIN DESCRIPTION - FREQUENCY: FREQUENCY: CONTINUOUS

## 2018-09-25 ENCOUNTER — OFFICE VISIT (OUTPATIENT)
Dept: ORTHOPEDIC SURGERY | Age: 53
End: 2018-09-25
Payer: COMMERCIAL

## 2018-09-25 DIAGNOSIS — M50.30 DEGENERATION OF CERVICAL INTERVERTEBRAL DISC: Primary | ICD-10-CM

## 2018-09-25 DIAGNOSIS — M54.12 BRACHIAL NEURITIS OR RADICULITIS: ICD-10-CM

## 2018-09-25 DIAGNOSIS — M54.2 CERVICALGIA: ICD-10-CM

## 2018-09-25 PROCEDURE — G8427 DOCREV CUR MEDS BY ELIG CLIN: HCPCS | Performed by: ORTHOPAEDIC SURGERY

## 2018-09-25 PROCEDURE — G8417 CALC BMI ABV UP PARAM F/U: HCPCS | Performed by: ORTHOPAEDIC SURGERY

## 2018-09-25 PROCEDURE — 3017F COLORECTAL CA SCREEN DOC REV: CPT | Performed by: ORTHOPAEDIC SURGERY

## 2018-09-25 PROCEDURE — 4004F PT TOBACCO SCREEN RCVD TLK: CPT | Performed by: ORTHOPAEDIC SURGERY

## 2018-09-25 PROCEDURE — 99213 OFFICE O/P EST LOW 20 MIN: CPT | Performed by: ORTHOPAEDIC SURGERY

## 2018-09-25 NOTE — PROGRESS NOTES
Subjective:      Patient ID: Ronak Souza is a 45 y.o. female. HPI  Please refer my last clinic note    Patient is continuing to suffer from a esophageal structures patient continues to require repeat dilatations    Patient continues have neck pain with radiation left posterior shoulder    Radicular arm pain is actually somewhat better following physical therapy  Review of Systems    Objective:   Physical Exam   Constitutional: She is oriented to person, place, and time. She appears well-developed and well-nourished. HENT:   Head: Normocephalic and atraumatic. Eyes: Conjunctivae and EOM are normal.   Neck: Normal range of motion. Pulmonary/Chest: Effort normal. No respiratory distress. Neurological: She is alert and oriented to person, place, and time. She has normal strength. No sensory deficit. Normal gait   Skin: Skin is warm and dry. Psychiatric: Her behavior is normal. Thought content normal.   Nursing note and vitals reviewed. Diagnostic x-rays from last visit are reviewed patient status post C5 6 anterior cervical discectomy and fusion many years ago graft well incorporated    Assessment:      Encounter Diagnoses   Name Primary?     Degeneration of cervical intervertebral disc Yes    Cervicalgia     Brachial neuritis or radiculitis      Ongoing complaints of left periscapular and shoulder pain as well as neck pain      Plan:      MRI cervical    Follow-up        Angelina Herrera MD

## 2018-09-26 ENCOUNTER — OFFICE VISIT (OUTPATIENT)
Dept: OBGYN CLINIC | Age: 53
End: 2018-09-26
Payer: COMMERCIAL

## 2018-09-26 VITALS
HEART RATE: 106 BPM | WEIGHT: 167.6 LBS | DIASTOLIC BLOOD PRESSURE: 89 MMHG | SYSTOLIC BLOOD PRESSURE: 123 MMHG | BODY MASS INDEX: 28.77 KG/M2

## 2018-09-26 DIAGNOSIS — N76.0 ACUTE VAGINITIS: ICD-10-CM

## 2018-09-26 DIAGNOSIS — B00.9 HERPES: Primary | ICD-10-CM

## 2018-09-26 PROCEDURE — G8427 DOCREV CUR MEDS BY ELIG CLIN: HCPCS | Performed by: SPECIALIST

## 2018-09-26 PROCEDURE — 99213 OFFICE O/P EST LOW 20 MIN: CPT | Performed by: SPECIALIST

## 2018-09-26 PROCEDURE — 3017F COLORECTAL CA SCREEN DOC REV: CPT | Performed by: SPECIALIST

## 2018-09-26 PROCEDURE — G8417 CALC BMI ABV UP PARAM F/U: HCPCS | Performed by: SPECIALIST

## 2018-09-26 PROCEDURE — 4004F PT TOBACCO SCREEN RCVD TLK: CPT | Performed by: SPECIALIST

## 2018-09-26 RX ORDER — FLUCONAZOLE 100 MG/1
100 TABLET ORAL DAILY
Qty: 7 TABLET | Refills: 1 | Status: SHIPPED | OUTPATIENT
Start: 2018-09-26 | End: 2018-09-27

## 2018-09-26 RX ORDER — METRONIDAZOLE 500 MG/1
500 TABLET ORAL 2 TIMES DAILY
Qty: 14 TABLET | Refills: 0 | Status: SHIPPED | OUTPATIENT
Start: 2018-09-26 | End: 2018-10-03

## 2018-09-26 ASSESSMENT — ENCOUNTER SYMPTOMS
COUGH: 0
VOMITING: 0
ABDOMINAL PAIN: 0
NAUSEA: 0
EYE PAIN: 0
APNEA: 0
CONSTIPATION: 0
DIARRHEA: 0
ABDOMINAL DISTENTION: 0

## 2018-09-26 NOTE — PROGRESS NOTES
Subjective:      Patient ID: Ama Gallego is a 45 y.o. female. Chief Complaint   Patient presents with   [facility] Gynecologic Exam     Patient is here today for a lump on her vagina. /89 (Site: Right Upper Arm, Position: Sitting, Cuff Size: Large Adult)   Pulse 106   Wt 167 lb 9.6 oz (76 kg)   LMP 07/10/2012   BMI 28.77 kg/m²   Patient's last menstrual period was 07/10/2012.     Past Medical History:   Diagnosis Date    Acid reflux disease     Anxiety     Arthralgia     B[facility] disorder ([facility] [address]     Brachial neuritis or radiculitis NOS 10/23/2012    Cigarette smoker     Degeneration of cervical intervertebral disc 10/23/2012    Depression     Essential hypertension 2018    Fibroid uterus     Hyperlipidemia     Mass     rt elbow    Periodic limb movement disorder     Substance abuse     H/O alcohol, crack cocaine, cannabis--in remission since      Current Outpatient Prescriptions Ordered in [facility]   Medication Sig Dispense Refill    fluconazole (DIFLUCAN) 100 MG tablet Take 1 tablet by mouth daily for 7 days 7 tablet 1    metroNIDAZOLE (FLAGYL) 500 MG tablet Take 1 tablet by mouth 2 times daily for 7 days 14 tablet 0    traZODone (DESYREL) 50 MG tablet Take 50 mg by mouth nightly      fluconazole (DIFLUCAN) 200 MG tablet Take 200 mg by mouth daily      Sucralfate (CARAFATE PO) Take by mouth      traMADol (ULTRAM) 50 MG tablet Take 1 tablet by mouth every 8 hours as needed for Pain (pain) for up to 30 days. Intended supply: 30 days. Take lowest dose possible to manage pain.  90 tablet 0    cyclobenzaprine (FLEXERIL) 10 MG tablet Take 10 mg by mouth 3 times daily as needed for Muscle spasms      amLODIPine (NORVASC) 10 MG tablet take 1 tablet by mouth once daily 30 tablet 2    valACYclovir (VALTREX) 500 MG tablet Take 1 tablet by mouth 2 times daily 30 tablet 0    Multiple Vitamins-Minerals (MULTIVITAMIN ADULT PO) Take 1 tablet by mouth      omeprazole (PRILOSEC) 40 MG Additional findings are as noted. I agree with the chief complaint, past medical history, past surgical history, allergies, medications, social and family history as documented unless otherwise noted below.      Electronically signed by Margie Negro MD on 9/28/2018 at 2:49 AM

## 2018-09-27 ENCOUNTER — HOSPITAL ENCOUNTER (OUTPATIENT)
Dept: PAIN MANAGEMENT | Age: 53
Discharge: HOME | End: 2018-09-27
Payer: COMMERCIAL

## 2018-09-27 VITALS
RESPIRATION RATE: 16 BRPM | TEMPERATURE: 98.6 F | WEIGHT: 167 LBS | HEART RATE: 100 BPM | DIASTOLIC BLOOD PRESSURE: 76 MMHG | BODY MASS INDEX: 28.51 KG/M2 | HEIGHT: 64 IN | SYSTOLIC BLOOD PRESSURE: 123 MMHG | OXYGEN SATURATION: 99 %

## 2018-09-27 DIAGNOSIS — M47.22 OSTEOARTHRITIS OF SPINE WITH RADICULOPATHY, CERVICAL REGION: ICD-10-CM

## 2018-09-27 DIAGNOSIS — Q76.1 CERVICAL FUSION SYNDROME: ICD-10-CM

## 2018-09-27 DIAGNOSIS — M50.30 DEGENERATION OF CERVICAL INTERVERTEBRAL DISC: Primary | ICD-10-CM

## 2018-09-27 DIAGNOSIS — M54.2 CERVICALGIA: ICD-10-CM

## 2018-09-27 PROCEDURE — 99214 OFFICE O/P EST MOD 30 MIN: CPT | Performed by: PAIN MEDICINE

## 2018-09-27 PROCEDURE — 99213 OFFICE O/P EST LOW 20 MIN: CPT

## 2018-09-27 PROCEDURE — 80307 DRUG TEST PRSMV CHEM ANLYZR: CPT

## 2018-09-27 ASSESSMENT — PAIN DESCRIPTION - DESCRIPTORS: DESCRIPTORS: CONSTANT

## 2018-09-27 ASSESSMENT — PAIN DESCRIPTION - LOCATION: LOCATION: NECK;SHOULDER

## 2018-09-27 ASSESSMENT — PAIN SCALES - GENERAL: PAINLEVEL_OUTOF10: 7

## 2018-09-27 ASSESSMENT — PAIN DESCRIPTION - FREQUENCY: FREQUENCY: CONTINUOUS

## 2018-09-27 ASSESSMENT — PAIN DESCRIPTION - PROGRESSION: CLINICAL_PROGRESSION: GRADUALLY WORSENING

## 2018-09-27 ASSESSMENT — PAIN DESCRIPTION - PAIN TYPE: TYPE: CHRONIC PAIN

## 2018-09-27 ASSESSMENT — PAIN DESCRIPTION - ORIENTATION: ORIENTATION: LEFT

## 2018-09-27 ASSESSMENT — PAIN DESCRIPTION - ONSET: ONSET: ON-GOING

## 2018-09-27 NOTE — PROGRESS NOTES
(decrease pain and increase activity)  Pain Intervention(s): Medication (see eMar)              ADVERSE MEDICATION EFFECTS:   Constipation: no  Bowel Regimen: Yes  Diet: common adult  Appetite:  poor  Sedation:  not applicable  Urinary Retention: no     FOCUSED PAIN SCALE:  Highest : 9  Lowest :3  Average: Range-7  When and What  was your last procedure:  Right shoulder, , years ago, 2004  Was your procedure effective:  Yes ongoing     ACTIVITY/SOCIAL/EMOTIONAL:  Sleep Pattern: 4 hours per night. difficulty falling asleep, nightime awakenings and difficulty falling back asleep if awakened  Energy Level:  Tired/Fatigued  Currently attending Physical Therapy:  went only once  Home Exercises: never none  Mobility: walking increases the pain  Currently seeing a Psychiatrist or Psychologist:  Yes, [facility]  Emotional Issues: anxiety/ nervousness and moodiness      Mood: depressed      ABERRANT BEHAVIORS SINCE LAST VISIT:  Have you ever been treated in another Pain Clinic yes, years ago went for evaluation, never followed thru  Refills for prescriptions appropriate: yes  Lost rx/pills: no  Taking more medication than prescribed:  no  Are you receiving PAIN medications from  other doctors: no  Last Urine/Serum Drug Screen :5/1/13 Dr. Vinson Fore   Was Serum/UDS as anticipated?   No, THC,OPITATES, OXYCODONE, METHADONE  Brought pill bottles in :yes   Was Pill count appropriate? :yes  Are currently pregnant? not applicable  Recent ER visits: No                Past Medical History      Diagnosis Date    Acid reflux disease     Anxiety     Arthralgia     [facility] ([facility] [address]     Brachial neuritis or radiculitis NOS 10/23/2012    Cigarette smoker     Degeneration of cervical intervertebral disc 10/23/2012    Depression     Essential hypertension 1/18/2018    Fibroid uterus     Hyperlipidemia     Mass     rt elbow    Periodic limb movement disorder     Substance abuse     H/O alcohol, crack cocaine, History  Social History     Social History    Marital status: Single     Spouse name: N/A    Number of children: N/A    Years of education: N/A     Social History Main Topics    Smoking status: Current Every Day Smoker     Packs/day: 0.50     Years: 20.00     Types: Cigarettes    Smokeless tobacco: Never Used    Alcohol use No    Drug use: Yes     Types: Marijuana      Comment: last July 2018    Sexual activity: Not Currently     Other Topics Concern    None     Social History Narrative    None      reports that she uses drugs, including Marijuana. REVIEW OF SYSTEMS:  Review of Systems   Constitutional: Negative for chills, fever and weight loss. HENT: Negative for congestion, hearing loss and tinnitus. Eyes: Negative for blurred vision and photophobia. Respiratory: Negative for cough and shortness of breath. Cardiovascular: Negative for chest pain and palpitations. Gastrointestinal: Negative for constipation, heartburn, nausea and vomiting. Genitourinary: Negative for dysuria and frequency. Musculoskeletal: Positive for neck pain. Skin: Negative for itching and rash. Neurological: Positive for tingling and weakness. Negative for dizziness and speech change. Endo/Heme/Allergies: Negative for polydipsia. Does not bruise/bleed easily. Psychiatric/Behavioral: Negative for depression and suicidal ideas. GENERAL PHYSICAL EXAM:  Vitals: /76   Pulse 100   Temp 98.6 °F (37 °C) (Oral)   Resp 16   Ht 5' 4\" (1.626 m)   Wt 167 lb (75.8 kg)   LMP 07/10/2012   SpO2 99%   BMI 28.67 kg/m² , Body mass index is 28.67 kg/m². Physical Exam   Constitutional: She is oriented to person, place, and time. She appears well-developed and well-nourished. HENT:   Head: Normocephalic and atraumatic. Eyes: Pupils are equal, round, and reactive to light. Conjunctivae and EOM are normal.   Neck: Normal range of motion. Neck supple. No tracheal deviation present.  No

## 2018-09-28 ASSESSMENT — ENCOUNTER SYMPTOMS
COUGH: 0
PHOTOPHOBIA: 0
SHORTNESS OF BREATH: 0
VOMITING: 0
NAUSEA: 0
CONSTIPATION: 0
BLURRED VISION: 0
HEARTBURN: 0

## 2018-09-30 LAB
6-ACETYLMORPHINE, UR: NOT DETECTED
7-AMINOCLONAZEPAM, URINE: NOT DETECTED
ALPHA-OH-ALPRAZ, URINE: NOT DETECTED
ALPRAZOLAM, URINE: NOT DETECTED
AMPHETAMINES, URINE: NOT DETECTED
BARBITURATES, URINE: NOT DETECTED
BENZOYLECGONINE, UR: NORMAL
BUPRENORPHINE URINE: NOT DETECTED
CARISOPRODOL, UR: NOT DETECTED
CLONAZEPAM, URINE: NOT DETECTED
CODEINE, URINE: NOT DETECTED
CREATININE URINE: 257 MG/DL (ref 20–400)
DIAZEPAM, URINE: NOT DETECTED
DRUGS EXPECTED, UR: NORMAL
EER HI RES INTERP UR: NORMAL
ETHYL GLUCURONIDE UR: NORMAL
FENTANYL URINE: NOT DETECTED
HYDROCODONE, URINE: NOT DETECTED
HYDROMORPHONE, URINE: NOT DETECTED
LORAZEPAM, URINE: NOT DETECTED
MARIJUANA METAB, UR: NORMAL
MDA, UR: NOT DETECTED
MDEA, EVE, UR: NOT DETECTED
MDMA URINE: NOT DETECTED
MEPERIDINE METAB, UR: NOT DETECTED
METHADONE, URINE: NOT DETECTED
METHAMPHETAMINE, URINE: NOT DETECTED
METHYLPHENIDATE: NOT DETECTED
MIDAZOLAM, URINE: NOT DETECTED
MORPHINE URINE: NOT DETECTED
NORBUPRENORPHINE, URINE: NOT DETECTED
NORDIAZEPAM, URINE: NOT DETECTED
NORFENTANYL, URINE: NOT DETECTED
NORHYDROCODONE, URINE: NOT DETECTED
NOROXYCODONE, URINE: NOT DETECTED
NOROXYMORPHONE, URINE: NOT DETECTED
OXAZEPAM, URINE: NOT DETECTED
OXYCODONE URINE: NOT DETECTED
OXYMORPHONE, URINE: NOT DETECTED
PAIN MANAGEMENT DRUG PANEL INTERP, URINE: NORMAL
PAIN MGT DRUG PANEL, HI RES, UR: NORMAL
PCP,URINE: NOT DETECTED
PHENTERMINE, UR: NOT DETECTED
PROPOXYPHENE, URINE: NOT DETECTED
TAPENTADOL, URINE: NOT DETECTED
TAPENTADOL-O-SULFATE, URINE: NOT DETECTED
TEMAZEPAM, URINE: NOT DETECTED
TRAMADOL, URINE: PRESENT
ZOLPIDEM, URINE: NOT DETECTED

## 2018-10-04 ENCOUNTER — HOSPITAL ENCOUNTER (OUTPATIENT)
Dept: PHYSICAL THERAPY | Age: 53
Setting detail: THERAPIES SERIES
Discharge: HOME | End: 2018-10-04
Payer: COMMERCIAL

## 2018-10-04 ENCOUNTER — HOSPITAL ENCOUNTER (OUTPATIENT)
Dept: MRI IMAGING | Age: 53
Discharge: HOME | End: 2018-10-06
Payer: COMMERCIAL

## 2018-10-04 DIAGNOSIS — M54.2 CERVICALGIA: ICD-10-CM

## 2018-10-04 DIAGNOSIS — M50.30 DEGENERATION OF CERVICAL INTERVERTEBRAL DISC: ICD-10-CM

## 2018-10-04 DIAGNOSIS — M54.12 BRACHIAL NEURITIS OR RADICULITIS: ICD-10-CM

## 2018-10-04 PROCEDURE — 72141 MRI NECK SPINE W/O DYE: CPT

## 2018-10-04 NOTE — PROGRESS NOTES
[] [facility] ([facility]) @ [facility]  [address] [facility] [address], [address]  Phone [phone]  Fax [phone]    Physical Therapy Discharge Note    Date: 10/4/2018      Patient: Giovanna Jaramillo  : 1965  MRN: [id number]   Physician: Aide Carvalho MD               Insurance:[facility] Diagnosis:  M54.2 (ICD-10-CM) - Cervicalgia   M54.12 (ICD-10-CM) - Brachial neuritis or radiculitis   M50.30 (ICD-10-CM) - Degeneration of cervical intervertebral disc       Rehab Codes: Postural abn, MM Weakness   Onset Date: 7 yrs ago   Total visits attended:1  Cancels/No shows:0/0  Date of initial visit: 18                  [] Patient recovered from conditions. Treatment goals were met. [] Patient received maximum benefit. No further therapy indicated at this time. [] Patient demonstrated improvement from condition with   Of   Short term goals met. []Patient demonstrated improvement from condition with    Of   Long term goals met. [] Patient to continue exercise/home instructions independently. [] Therapy interrupted due to:    [] Patient has 2 or more no shows/cancels, is discontinued per our policy. [] Patient has completed prescribed number of treatment sessions. [x] Other: Insurance denied Physical Therapy treatment     Pain level at evaluation was  9/10 and at discharge was   unknown    It Is My Understanding That The:  [] Patient returned to work. [] Patient demonstrated improved level of function. [] Patient returned to previous functional level.   [] Patient's current functional status is unknown due to delay from insurance co.  [] Other:     Recommendations/Comments:    Treatment Included:  [] Therapeutic Exercise    [] Modalities:  [] Therapeutic Activity    [] Ultrasound  [] Electrical Stimulation  [] Gait Training     [] Massage       [] Lumbar/Cervical Traction  [] Neuromuscular Re-education [] Cold/hotpack [] Iontophoresis: 4 mg/mL  []

## 2018-10-09 RX ORDER — VALACYCLOVIR HYDROCHLORIDE 500 MG/1
TABLET, FILM COATED ORAL
Qty: 30 TABLET | Refills: 0 | Status: SHIPPED | OUTPATIENT
Start: 2018-10-09

## 2018-10-18 ENCOUNTER — OFFICE VISIT (OUTPATIENT)
Dept: ORTHOPEDIC SURGERY | Age: 53
End: 2018-10-18
Payer: COMMERCIAL

## 2018-10-18 ENCOUNTER — HOSPITAL ENCOUNTER (OUTPATIENT)
Dept: PAIN MANAGEMENT | Age: 53
Discharge: HOME | End: 2018-10-18
Payer: COMMERCIAL

## 2018-10-18 VITALS
BODY MASS INDEX: 28.51 KG/M2 | SYSTOLIC BLOOD PRESSURE: 116 MMHG | WEIGHT: 167 LBS | OXYGEN SATURATION: 98 % | TEMPERATURE: 99.6 F | HEIGHT: 64 IN | DIASTOLIC BLOOD PRESSURE: 84 MMHG | RESPIRATION RATE: 18 BRPM | HEART RATE: 103 BPM

## 2018-10-18 DIAGNOSIS — M50.30 DEGENERATION OF CERVICAL INTERVERTEBRAL DISC: ICD-10-CM

## 2018-10-18 DIAGNOSIS — M54.2 CERVICALGIA: ICD-10-CM

## 2018-10-18 DIAGNOSIS — Q76.1 CERVICAL FUSION SYNDROME: ICD-10-CM

## 2018-10-18 DIAGNOSIS — M47.22 OSTEOARTHRITIS OF SPINE WITH RADICULOPATHY, CERVICAL REGION: ICD-10-CM

## 2018-10-18 DIAGNOSIS — M54.12 BRACHIAL NEURITIS OR RADICULITIS: ICD-10-CM

## 2018-10-18 DIAGNOSIS — M54.12 CERVICAL RADICULOPATHY: Primary | ICD-10-CM

## 2018-10-18 DIAGNOSIS — M50.30 DEGENERATION OF CERVICAL INTERVERTEBRAL DISC: Primary | ICD-10-CM

## 2018-10-18 PROCEDURE — 3017F COLORECTAL CA SCREEN DOC REV: CPT | Performed by: ORTHOPAEDIC SURGERY

## 2018-10-18 PROCEDURE — G8484 FLU IMMUNIZE NO ADMIN: HCPCS | Performed by: ORTHOPAEDIC SURGERY

## 2018-10-18 PROCEDURE — G8417 CALC BMI ABV UP PARAM F/U: HCPCS | Performed by: ORTHOPAEDIC SURGERY

## 2018-10-18 PROCEDURE — 99214 OFFICE O/P EST MOD 30 MIN: CPT | Performed by: PAIN MEDICINE

## 2018-10-18 PROCEDURE — 4004F PT TOBACCO SCREEN RCVD TLK: CPT | Performed by: ORTHOPAEDIC SURGERY

## 2018-10-18 PROCEDURE — 99213 OFFICE O/P EST LOW 20 MIN: CPT | Performed by: ORTHOPAEDIC SURGERY

## 2018-10-18 PROCEDURE — 99213 OFFICE O/P EST LOW 20 MIN: CPT

## 2018-10-18 PROCEDURE — G8427 DOCREV CUR MEDS BY ELIG CLIN: HCPCS | Performed by: ORTHOPAEDIC SURGERY

## 2018-10-18 RX ORDER — SODIUM CHLORIDE, SODIUM LACTATE, POTASSIUM CHLORIDE, CALCIUM CHLORIDE 600; 310; 30; 20 MG/100ML; MG/100ML; MG/100ML; MG/100ML
75 INJECTION, SOLUTION INTRAVENOUS CONTINUOUS
Status: CANCELLED | OUTPATIENT
Start: 2018-10-18

## 2018-10-18 ASSESSMENT — ENCOUNTER SYMPTOMS
EYE DISCHARGE: 0
WHEEZING: 1
EYES NEGATIVE: 1
NAUSEA: 0
CONSTIPATION: 0
PHOTOPHOBIA: 0
ABDOMINAL PAIN: 0
VOMITING: 0
ALLERGIC/IMMUNOLOGIC NEGATIVE: 1
BACK PAIN: 1
RHINORRHEA: 0

## 2018-10-18 ASSESSMENT — PAIN DESCRIPTION - LOCATION: LOCATION: BACK;NECK;SHOULDER

## 2018-10-18 ASSESSMENT — PAIN DESCRIPTION - PROGRESSION: CLINICAL_PROGRESSION: GRADUALLY WORSENING

## 2018-10-18 ASSESSMENT — PAIN SCALES - GENERAL: PAINLEVEL_OUTOF10: 10

## 2018-10-18 ASSESSMENT — PAIN DESCRIPTION - PAIN TYPE: TYPE: CHRONIC PAIN

## 2018-10-18 ASSESSMENT — PAIN DESCRIPTION - ONSET: ONSET: ON-GOING

## 2018-10-18 ASSESSMENT — PAIN DESCRIPTION - FREQUENCY: FREQUENCY: CONTINUOUS

## 2018-10-18 ASSESSMENT — PAIN DESCRIPTION - ORIENTATION: ORIENTATION: LEFT

## 2018-10-18 NOTE — PROGRESS NOTES
[facility] Pain Management  Patient Pain Assessment  RECHECK - Dr. Luisa Zamora    Primary Care Physician: Whit Guido MD    Chief complaint:   Chief Complaint   Patient presents with    Neck Pain   . HISTORY OF PRESENT ILLNESS:  Suzanne Clark is 45 y.o. female with    Patient attended pain clinic with a chief complaint of pain involving the neck with pain radiating to the left upper extremity. Patient had the MRI done recently. Patient reports there is not much change in her neck pain since her initial visit patient is also complaining of numbness in her left thumb which is present since her 1st surgery in 2013 and she had a cervical fusion done at C5 6. She reports the pain involves the entire hand along with some tingling and numbness in the fingers. Neck Pain    This is a chronic problem. The current episode started more than 1 year ago. The problem occurs constantly. The problem has been gradually worsening. The pain is present in the left side and midline. The quality of the pain is described as aching, burning and cramping (Spasms). The pain is at a severity of 10/10. The pain is severe. The symptoms are aggravated by bending. The pain is same all the time. Associated symptoms include a fever, numbness, tingling and weakness. Pertinent negatives include no photophobia.        OARRS compliant? not applicable  Concern for prescription abuse?not applicable    Current Pain Assessment  Pain Assessment  Pain Assessment: 0-10  Pain Level: 10  Pain Type: Chronic pain  Pain Location: Back, Neck, Shoulder  Pain Orientation: Left  Pain Radiating Towards: down left shoulder and into upper back  Pain Descriptors: Constant, Jabbing, Burning, Aching, Cramping, Spasm  Pain Frequency: Continuous  Pain Onset: On-going  Clinical Progression: Gradually worsening  Effect of Pain on Daily Activities: painful when resting, supine, and turning head  Patient's Stated Pain Goal: 2 (goal to turn head History    Marital status: Single     Spouse name: N/A    Number of children: N/A    Years of education: N/A     Social History Main Topics    Smoking status: Current Every Day Smoker     Packs/day: 0.50     Years: 20.00     Types: Cigarettes    Smokeless tobacco: Never Used    Alcohol use No    Drug use: Yes     Types: Marijuana      Comment: last July 2018    Sexual activity: Not Currently     Other Topics Concern    None     Social History Narrative    None      reports that she uses drugs, including Marijuana. REVIEW OF SYSTEMS:  Review of Systems   Constitutional: Positive for appetite change, chills, fatigue and fever. HENT: Negative. Negative for congestion, hearing loss and rhinorrhea. Eyes: Negative. Negative for photophobia, discharge and visual disturbance. Respiratory: Positive for wheezing. Wheezing at night on occasion      Cardiovascular: Negative. Gastrointestinal: Negative for abdominal pain, constipation, nausea and vomiting. Bloating   Endocrine: Negative. Negative for polydipsia and polyphagia. Genitourinary: Negative. Negative for dysuria and hematuria. Musculoskeletal: Positive for back pain, neck pain and neck stiffness. Skin: Negative. Allergic/Immunologic: Negative. Neurological: Positive for tingling, weakness, light-headedness and numbness. Hematological: Bruises/bleeds easily. Psychiatric/Behavioral: Positive for agitation. Negative for confusion and suicidal ideas. The patient is nervous/anxious. GENERAL PHYSICAL EXAM:  Vitals: /84   Pulse 103   Temp 99.6 °F (37.6 °C) (Oral)   Resp 18   Ht 5' 4\" (1.626 m)   Wt 167 lb (75.8 kg)   LMP 07/10/2012   SpO2 98%   BMI 28.67 kg/m² , Body mass index is 28.67 kg/m². Physical Exam   Constitutional: She is oriented to person, place, and time. She appears well-developed and well-nourished. HENT:   Head: Normocephalic and atraumatic.    Eyes: Pupils are equal, Strength   The patient has normal left wrist strength. Right Elbow Exam     Muscle Strength   The patient has normal right elbow strength. Left Elbow Exam     Muscle Strength   The patient has normal left elbow strength. Right Shoulder Exam     Range of Motion   The patient has normal right shoulder ROM. Muscle Strength   The patient has normal right shoulder strength. Left Shoulder Exam     Range of Motion   The patient has normal left shoulder ROM. Muscle Strength   The patient has normal left shoulder strength. Nurses Notes and Vital Signs reviewed.     DATA  Labs:  Benzodiazepines   Date Value Ref Range Status   05/01/2013 NEGATIVE NEG Final     Comment:           (Positive cutoff 100 ng/mL)                    Imaging:  Radiology Images and Reports reviewed where indicated and necessary  MRI of the cervical spine and 10/4/2018:  FINDINGS:   BONES/ALIGNMENT: Postsurgical change from anterior fusion at C5-C6.  The   vertebral body heights appear maintained.  There straightening of the normal   cervical lordosis.  There is no convincing spondylolisthesis.  The bone   marrow signal demonstrates no acute abnormality.       SPINAL CORD: No abnormal cord signal is seen.       SOFT TISSUES: No paraspinal mass identified.       C2-C3: There is no significant disc protrusion, spinal canal stenosis or   neural foraminal narrowing.       C3-C4: There is no significant disc protrusion, spinal canal stenosis or   neural foraminal narrowing.       C4-C5: There is a disc bulge flattening the ventral thecal sac.  No   significant spinal canal stenosis.  Uncovertebral joint and facet arthrosis   contribute to mild right and moderate to severe left neural foraminal   narrowing.  Findings appear progressed.       C5-C6: There is a posterior osteophyte flattening the ventral thecal sac.  No   significant spinal canal stenosis.  Uncovertebral joint and facet arthrosis   contribute to moderate left unable to prescribe her any more narcotics in future.     Electronically signed by Lauren Chiang MD on 10/18/2018 at 5:59 PM

## 2018-10-18 NOTE — PROGRESS NOTES
Rn returned to the pt's room to review and provide pre procedure instructions. Pt not found in room, left the unit. Dr. Kiran Arriaga not prescribing medications at this time for the pt. DC paperwork not signed by pt.

## 2018-10-26 ENCOUNTER — TELEPHONE (OUTPATIENT)
Dept: OBGYN CLINIC | Age: 53
End: 2018-10-26

## 2018-10-26 DIAGNOSIS — Z12.39 SCREENING BREAST EXAMINATION: Primary | ICD-10-CM

## 2018-10-29 DIAGNOSIS — N64.4 BREAST PAIN: Primary | ICD-10-CM

## 2018-11-08 ENCOUNTER — HOSPITAL ENCOUNTER (OUTPATIENT)
Dept: WOMENS IMAGING | Age: 53
End: 2018-11-08
Payer: COMMERCIAL

## 2018-11-08 ENCOUNTER — HOSPITAL ENCOUNTER (OUTPATIENT)
Dept: WOMENS IMAGING | Age: 53
Discharge: HOME | End: 2018-11-10
Payer: COMMERCIAL

## 2018-11-08 DIAGNOSIS — N64.4 BREAST PAIN: ICD-10-CM

## 2018-11-08 PROCEDURE — 77066 DX MAMMO INCL CAD BI: CPT

## 2018-11-21 ENCOUNTER — TELEPHONE (OUTPATIENT)
Dept: OBGYN CLINIC | Age: 53
End: 2018-11-21

## 2018-12-10 ENCOUNTER — OFFICE VISIT (OUTPATIENT)
Dept: FAMILY MEDICINE CLINIC | Age: 53
End: 2018-12-10
Payer: COMMERCIAL

## 2018-12-10 VITALS
HEIGHT: 64 IN | SYSTOLIC BLOOD PRESSURE: 130 MMHG | BODY MASS INDEX: 28.51 KG/M2 | OXYGEN SATURATION: 100 % | HEART RATE: 79 BPM | WEIGHT: 167 LBS | DIASTOLIC BLOOD PRESSURE: 82 MMHG | TEMPERATURE: 98.7 F

## 2018-12-10 DIAGNOSIS — I10 ESSENTIAL HYPERTENSION: Primary | ICD-10-CM

## 2018-12-10 DIAGNOSIS — M79.10 MYALGIA: ICD-10-CM

## 2018-12-10 DIAGNOSIS — E78.5 HYPERLIPIDEMIA, UNSPECIFIED HYPERLIPIDEMIA TYPE: ICD-10-CM

## 2018-12-10 PROCEDURE — 4004F PT TOBACCO SCREEN RCVD TLK: CPT | Performed by: FAMILY MEDICINE

## 2018-12-10 PROCEDURE — G8427 DOCREV CUR MEDS BY ELIG CLIN: HCPCS | Performed by: FAMILY MEDICINE

## 2018-12-10 PROCEDURE — G8484 FLU IMMUNIZE NO ADMIN: HCPCS | Performed by: FAMILY MEDICINE

## 2018-12-10 PROCEDURE — 3017F COLORECTAL CA SCREEN DOC REV: CPT | Performed by: FAMILY MEDICINE

## 2018-12-10 PROCEDURE — G8417 CALC BMI ABV UP PARAM F/U: HCPCS | Performed by: FAMILY MEDICINE

## 2018-12-10 PROCEDURE — 99214 OFFICE O/P EST MOD 30 MIN: CPT | Performed by: FAMILY MEDICINE

## 2018-12-10 RX ORDER — LOSARTAN POTASSIUM 25 MG/1
25 TABLET ORAL DAILY
Qty: 30 TABLET | Refills: 1 | Status: SHIPPED | OUTPATIENT
Start: 2018-12-10 | End: 2019-05-30 | Stop reason: CLARIF

## 2018-12-10 ASSESSMENT — ENCOUNTER SYMPTOMS
SHORTNESS OF BREATH: 0
NAUSEA: 0
SORE THROAT: 0
ABDOMINAL PAIN: 0
CONSTIPATION: 0

## 2018-12-17 ENCOUNTER — TELEPHONE (OUTPATIENT)
Dept: FAMILY MEDICINE CLINIC | Age: 53
End: 2018-12-17

## 2018-12-18 ENCOUNTER — APPOINTMENT (OUTPATIENT)
Dept: GENERAL RADIOLOGY | Age: 53
End: 2018-12-18
Payer: COMMERCIAL

## 2018-12-18 ENCOUNTER — TELEPHONE (OUTPATIENT)
Dept: FAMILY MEDICINE CLINIC | Age: 53
End: 2018-12-18

## 2018-12-18 ENCOUNTER — HOSPITAL ENCOUNTER (EMERGENCY)
Age: 53
Discharge: HOME | End: 2018-12-18
Attending: EMERGENCY MEDICINE
Payer: COMMERCIAL

## 2018-12-18 VITALS
BODY MASS INDEX: 28.17 KG/M2 | HEART RATE: 91 BPM | TEMPERATURE: 98 F | SYSTOLIC BLOOD PRESSURE: 140 MMHG | RESPIRATION RATE: 18 BRPM | DIASTOLIC BLOOD PRESSURE: 90 MMHG | WEIGHT: 165 LBS | HEIGHT: 64 IN | OXYGEN SATURATION: 98 %

## 2018-12-18 DIAGNOSIS — E87.6 HYPOKALEMIA: ICD-10-CM

## 2018-12-18 DIAGNOSIS — M79.18 MUSCULOSKELETAL PAIN: ICD-10-CM

## 2018-12-18 DIAGNOSIS — N39.0 URINARY TRACT INFECTION WITHOUT HEMATURIA, SITE UNSPECIFIED: Primary | ICD-10-CM

## 2018-12-18 LAB
-: ABNORMAL
ABSOLUTE EOS #: 0.2 K/UL (ref 0–0.4)
ABSOLUTE IMMATURE GRANULOCYTE: ABNORMAL K/UL (ref 0–0.3)
ABSOLUTE LYMPH #: 2.3 K/UL (ref 1–4.8)
ABSOLUTE MONO #: 0.5 K/UL (ref 0.2–0.8)
AMORPHOUS: ABNORMAL
ANION GAP SERPL CALCULATED.3IONS-SCNC: 10 MMOL/L (ref 9–17)
BACTERIA: ABNORMAL
BASOPHILS # BLD: 2 % (ref 0–2)
BASOPHILS ABSOLUTE: 0.1 K/UL (ref 0–0.2)
BILIRUBIN URINE: NEGATIVE
BUN BLDV-MCNC: 13 MG/DL (ref 6–20)
BUN/CREAT BLD: 25 (ref 9–20)
CALCIUM SERPL-MCNC: 9.5 MG/DL (ref 8.6–10.4)
CASTS UA: ABNORMAL /LPF
CHLORIDE BLD-SCNC: 100 MMOL/L (ref 98–107)
CO2: 31 MMOL/L (ref 20–31)
COLOR: YELLOW
COMMENT UA: ABNORMAL
CREAT SERPL-MCNC: 0.53 MG/DL (ref 0.5–0.9)
CRYSTALS, UA: ABNORMAL /HPF
DIFFERENTIAL TYPE: ABNORMAL
EOSINOPHILS RELATIVE PERCENT: 2 % (ref 1–4)
EPITHELIAL CELLS UA: ABNORMAL /HPF (ref 0–5)
GFR AFRICAN AMERICAN: >60 ML/MIN
GFR NON-AFRICAN AMERICAN: >60 ML/MIN
GFR SERPL CREATININE-BSD FRML MDRD: ABNORMAL ML/MIN/{1.73_M2}
GFR SERPL CREATININE-BSD FRML MDRD: ABNORMAL ML/MIN/{1.73_M2}
GLUCOSE BLD-MCNC: 129 MG/DL (ref 70–99)
GLUCOSE URINE: NEGATIVE
HCT VFR BLD CALC: 44.6 % (ref 36–46)
HEMOGLOBIN: 14.4 G/DL (ref 12–16)
IMMATURE GRANULOCYTES: ABNORMAL %
KETONES, URINE: NEGATIVE
LEUKOCYTE ESTERASE, URINE: ABNORMAL
LYMPHOCYTES # BLD: 30 % (ref 24–44)
MAGNESIUM: 2 MG/DL (ref 1.6–2.6)
MCH RBC QN AUTO: 29.2 PG (ref 26–34)
MCHC RBC AUTO-ENTMCNC: 32.3 G/DL (ref 31–37)
MCV RBC AUTO: 90.4 FL (ref 80–100)
MONOCYTES # BLD: 7 % (ref 1–7)
MUCUS: ABNORMAL
NITRITE, URINE: POSITIVE
NRBC AUTOMATED: ABNORMAL PER 100 WBC
OTHER OBSERVATIONS UA: ABNORMAL
PDW BLD-RTO: 14.6 % (ref 11.5–14.5)
PH UA: 5.5 (ref 5–8)
PLATELET # BLD: 368 K/UL (ref 130–400)
PLATELET ESTIMATE: ABNORMAL
PMV BLD AUTO: ABNORMAL FL (ref 6–12)
POTASSIUM SERPL-SCNC: 3.6 MMOL/L (ref 3.7–5.3)
PROTEIN UA: NEGATIVE
RBC # BLD: 4.94 M/UL (ref 4–5.2)
RBC # BLD: ABNORMAL 10*6/UL
RBC UA: ABNORMAL /HPF (ref 0–2)
RENAL EPITHELIAL, UA: ABNORMAL /HPF
SEG NEUTROPHILS: 59 % (ref 36–66)
SEGMENTED NEUTROPHILS ABSOLUTE COUNT: 4.7 K/UL (ref 1.8–7.7)
SODIUM BLD-SCNC: 141 MMOL/L (ref 135–144)
SPECIFIC GRAVITY UA: 1.03 (ref 1–1.03)
TRICHOMONAS: ABNORMAL
TURBIDITY: ABNORMAL
URINE HGB: ABNORMAL
UROBILINOGEN, URINE: NORMAL
WBC # BLD: 7.8 K/UL (ref 3.5–11)
WBC # BLD: ABNORMAL 10*3/UL
WBC UA: ABNORMAL /HPF (ref 0–5)
YEAST: ABNORMAL

## 2018-12-18 PROCEDURE — 80048 BASIC METABOLIC PNL TOTAL CA: CPT

## 2018-12-18 PROCEDURE — 87086 URINE CULTURE/COLONY COUNT: CPT

## 2018-12-18 PROCEDURE — 83735 ASSAY OF MAGNESIUM: CPT

## 2018-12-18 PROCEDURE — 6360000002 HC RX W HCPCS: Performed by: NURSE PRACTITIONER

## 2018-12-18 PROCEDURE — 85025 COMPLETE CBC W/AUTO DIFF WBC: CPT

## 2018-12-18 PROCEDURE — 36415 COLL VENOUS BLD VENIPUNCTURE: CPT

## 2018-12-18 PROCEDURE — 71046 X-RAY EXAM CHEST 2 VIEWS: CPT

## 2018-12-18 PROCEDURE — 87186 SC STD MICRODIL/AGAR DIL: CPT

## 2018-12-18 PROCEDURE — 87077 CULTURE AEROBIC IDENTIFY: CPT

## 2018-12-18 PROCEDURE — 96372 THER/PROPH/DIAG INJ SC/IM: CPT

## 2018-12-18 PROCEDURE — 81001 URINALYSIS AUTO W/SCOPE: CPT

## 2018-12-18 PROCEDURE — 99283 EMERGENCY DEPT VISIT LOW MDM: CPT

## 2018-12-18 RX ORDER — CEPHALEXIN 500 MG/1
500 CAPSULE ORAL 2 TIMES DAILY
Qty: 10 CAPSULE | Refills: 0 | Status: SHIPPED | OUTPATIENT
Start: 2018-12-18 | End: 2018-12-23

## 2018-12-18 RX ORDER — POTASSIUM CHLORIDE 20MEQ/15ML
20 LIQUID (ML) ORAL DAILY
Qty: 30 ML | Refills: 0 | OUTPATIENT
Start: 2018-12-18

## 2018-12-18 RX ORDER — FLUCONAZOLE 150 MG/1
150 TABLET ORAL DAILY
Qty: 7 TABLET | Refills: 0 | Status: SHIPPED | OUTPATIENT
Start: 2018-12-18 | End: 2018-12-25

## 2018-12-18 RX ORDER — NAPROXEN 500 MG/1
500 TABLET ORAL 2 TIMES DAILY WITH MEALS
Qty: 14 TABLET | Refills: 0 | Status: SHIPPED | OUTPATIENT
Start: 2018-12-18 | End: 2019-06-26

## 2018-12-18 RX ORDER — KETOROLAC TROMETHAMINE 30 MG/ML
30 INJECTION, SOLUTION INTRAMUSCULAR; INTRAVENOUS ONCE
Status: COMPLETED | OUTPATIENT
Start: 2018-12-18 | End: 2018-12-18

## 2018-12-18 RX ADMIN — KETOROLAC TROMETHAMINE 30 MG: 30 INJECTION, SOLUTION INTRAMUSCULAR at 11:18

## 2018-12-18 ASSESSMENT — PAIN DESCRIPTION - DESCRIPTORS: DESCRIPTORS: ACHING

## 2018-12-18 ASSESSMENT — PAIN DESCRIPTION - PAIN TYPE: TYPE: ACUTE PAIN

## 2018-12-18 ASSESSMENT — ENCOUNTER SYMPTOMS: BACK PAIN: 1

## 2018-12-18 ASSESSMENT — PAIN SCALES - GENERAL
PAINLEVEL_OUTOF10: 10
PAINLEVEL_OUTOF10: 10

## 2018-12-18 NOTE — ED PROVIDER NOTES
spasmsHistorical Med      Multiple Vitamins-Minerals (MULTIVITAMIN ADULT PO) Take 1 tablet by mouthHistorical Med      omeprazole (PRILOSEC) 40 MG delayed release capsule R-0Historical Med           ALLERGIES     is allergic to demeclocycline; sertraline; lisinopril; pravastatin; seasonal; tetracyclines & related; and bactrim. FAMILY HISTORY     indicated that her mother is . She indicated that her father is . She indicated that her brother is alive. She indicated that her maternal grandmother is . She indicated that her paternal grandmother is . SOCIAL HISTORY       Social History   Substance Use Topics    Smoking status: Current Every Day Smoker     Packs/day: 0.50     Years: 20.00     Types: Cigarettes    Smokeless tobacco: Never Used    Alcohol use No     PHYSICAL EXAM     INITIAL VITALS: BP (!) 140/90   Pulse 91   Temp 98 °F (36.7 °C)   Resp 18   Ht 5' 3.5\" (1.613 m)   Wt 165 lb (74.8 kg)   LMP 07/10/2012   SpO2 98%   BMI 28.77 kg/m²    Physical Exam    MEDICAL DECISION MAKING:          CRITICAL CARE:       PROCEDURES:    Procedures    DIAGNOSTIC RESULTS   EKG:All EKG's are interpreted by the Emergency Department Physician who either signs or Co-signs this chart in the absence of a cardiologist.      RADIOLOGY:All plain film, CT, MRI, and formal ultrasound images (except ED bedside ultrasound) are read by the radiologist, see reports below, unless otherwisenoted in MDM or here. XR CHEST STANDARD (2 VW)   Final Result   Stable negative chest.           LABS: All lab results were reviewed by myself, and all abnormals are listed below.   Labs Reviewed   CBC WITH AUTO DIFFERENTIAL - Abnormal; Notable for the following:        Result Value    RDW 14.6 (*)     All other components within normal limits   BASIC METABOLIC PANEL - Abnormal; Notable for the following:     Glucose 129 (*)     Bun/Cre Ratio 25 (*)     Potassium 3.6 (*)     All other components within normal capsule, R-0Print      fluconazole (DIFLUCAN) 150 MG tablet Take 1 tablet by mouth daily for 7 doses, Disp-7 tablet, R-0Print      naproxen (NAPROSYN) 500 MG tablet Take 1 tablet by mouth 2 times daily (with meals) for 7 days, Disp-14 tablet, [id number]           Malini Ramon MD  Attending Emergency Physician                    Malini Ramon MD  [id number]

## 2018-12-18 NOTE — ED PROVIDER NOTES
[address] [facility] ED  eMERGENCY dEPARTMENT eNCOUnter      Pt Name: Alda Mccord  MRN: [id number]  [place] 1965  Date of evaluation: 2018  Provider: SUZIE Cuellar CNP    CHIEF COMPLAINT       Chief Complaint   Patient presents with    Generalized Body Aches         HISTORY OFPRESENT ILLNESS  (Location/Symptom, Timing/Onset, Context/Setting, Quality, Duration, Modifying Factors, Severity.)   Alda Mccord is a 52 y.o. female who presents to the emergency department By private auto for evaluation of generalized body aches, muscle cramping and right upper back pain has been going on for the past week. Patient states typically has muscle pain but states it worsened recently. She denies injury. No cough, chest pain, abdominal pain, nausea or vomiting. Her back pain as a 10 out of 10. It is aggravated with movement. Nursing Notes were reviewed.     PASTMEDICAL HISTORY     Past Medical History:   Diagnosis Date    Acid reflux disease     Anxiety     Arthralgia     Bipolar disorder ([address]     Brachial neuritis or radiculitis NOS 10/23/2012    Cigarette smoker     Degeneration of cervical intervertebral disc 10/23/2012    Depression     Essential hypertension 2018    Fibroid uterus     Hyperlipidemia     Mass     rt elbow    Periodic limb movement disorder     Substance abuse ([address]     H/O alcohol, crack cocaine, cannabis--in remission since          SURGICAL HISTORY       Past Surgical History:   Procedure Laterality Date    BREAST BIOPSY      left breast - benign    CERVICAL SPINE SURGERY  2013    C6 C7 fusion      SECTION      DILATION AND CURETTAGE  2011 -- 12    ECTOPIC PREGNANCY SURGERY      left tube remains    HYSTEROSCOPY  11---   12    both with D&C    INDUCED       TUBAL LIGATION      UPPER GASTROINTESTINAL ENDOSCOPY      URETHRA SURGERY      VAGINA SURGERY      vaginal/rectum fistulum repair         CURRENT MEDICATIONS     Discharge Medication List as of 12/18/2018 11:10 AM      CONTINUE these medications which have NOT CHANGED    Details   losartan (COZAAR) 25 MG tablet Take 1 tablet by mouth daily, Disp-30 tablet, R-1Normal      valACYclovir (VALTREX) 500 MG tablet take 1 tablet by mouth twice a day, Disp-30 tablet, R-0Normal      traZODone (DESYREL) 50 MG tablet Take 50 mg by mouth nightlyHistorical Med      Sucralfate (CARAFATE PO) Take by mouthHistorical Med      cyclobenzaprine (FLEXERIL) 10 MG tablet Take 10 mg by mouth 3 times daily as needed for Muscle spasmsHistorical Med      Multiple Vitamins-Minerals (MULTIVITAMIN ADULT PO) Take 1 tablet by mouthHistorical Med      omeprazole (PRILOSEC) 40 MG delayed release capsule R-0Historical Med             ALLERGIES     Demeclocycline; Sertraline; Lisinopril; Pravastatin; Seasonal; Tetracyclines & related; and Bactrim    FAMILY HISTORY       Family History   Problem Relation Age of Onset    Diabetes Paternal Grandmother     Heart Disease Maternal Grandmother     Cancer Father         lung    Heart Disease Mother     Hypertension Mother     Hypertension Brother           SOCIAL HISTORY       Social History     Social History    Marital status: Single     Spouse name: N/A    Number of children: N/A    Years of education: N/A     Social History Main Topics    Smoking status: Current Every Day Smoker     Packs/day: 0.50     Years: 20.00     Types: Cigarettes    Smokeless tobacco: Never Used    Alcohol use No    Drug use: Yes     Types: Marijuana      Comment: last July 2018    Sexual activity: Not Currently     Other Topics Concern    None     Social History Narrative    None         REVIEW OF SYSTEMS    (2-9 systems for level 4, 10 or more for level 5)     Review of Systems   Constitutional: Positive for activity change. Musculoskeletal: Positive for back pain and myalgias. All other systems reviewed and are negative.     Except

## 2018-12-20 LAB
CULTURE: ABNORMAL
Lab: ABNORMAL
ORGANISM: ABNORMAL
SPECIMEN DESCRIPTION: ABNORMAL
STATUS: ABNORMAL

## 2018-12-24 ENCOUNTER — NURSE ONLY (OUTPATIENT)
Dept: FAMILY MEDICINE CLINIC | Age: 53
End: 2018-12-24

## 2018-12-24 VITALS — SYSTOLIC BLOOD PRESSURE: 138 MMHG | DIASTOLIC BLOOD PRESSURE: 96 MMHG

## 2018-12-24 DIAGNOSIS — I10 ESSENTIAL HYPERTENSION: Primary | ICD-10-CM

## 2018-12-27 ENCOUNTER — TELEPHONE (OUTPATIENT)
Dept: PEDIATRICS CLINIC | Age: 53
End: 2018-12-27

## 2018-12-27 DIAGNOSIS — R52 GENERALIZED PAIN: Primary | ICD-10-CM

## 2019-01-10 ENCOUNTER — OFFICE VISIT (OUTPATIENT)
Dept: ORTHOPEDIC SURGERY | Age: 54
End: 2019-01-10
Payer: COMMERCIAL

## 2019-01-10 DIAGNOSIS — M54.2 CERVICALGIA: ICD-10-CM

## 2019-01-10 DIAGNOSIS — M54.12 BRACHIAL NEURITIS OR RADICULITIS: ICD-10-CM

## 2019-01-10 DIAGNOSIS — M54.12 CERVICAL RADICULOPATHY: ICD-10-CM

## 2019-01-10 DIAGNOSIS — Z98.1 HISTORY OF FUSION OF CERVICAL SPINE: ICD-10-CM

## 2019-01-10 DIAGNOSIS — M50.30 DEGENERATION OF CERVICAL INTERVERTEBRAL DISC: Primary | ICD-10-CM

## 2019-01-10 PROCEDURE — 3017F COLORECTAL CA SCREEN DOC REV: CPT | Performed by: ORTHOPAEDIC SURGERY

## 2019-01-10 PROCEDURE — 99213 OFFICE O/P EST LOW 20 MIN: CPT | Performed by: ORTHOPAEDIC SURGERY

## 2019-01-10 PROCEDURE — G8417 CALC BMI ABV UP PARAM F/U: HCPCS | Performed by: ORTHOPAEDIC SURGERY

## 2019-01-10 PROCEDURE — 4004F PT TOBACCO SCREEN RCVD TLK: CPT | Performed by: ORTHOPAEDIC SURGERY

## 2019-01-10 PROCEDURE — G8484 FLU IMMUNIZE NO ADMIN: HCPCS | Performed by: ORTHOPAEDIC SURGERY

## 2019-01-10 PROCEDURE — G8427 DOCREV CUR MEDS BY ELIG CLIN: HCPCS | Performed by: ORTHOPAEDIC SURGERY

## 2019-01-10 RX ORDER — PREGABALIN 50 MG/1
50 CAPSULE ORAL 3 TIMES DAILY
COMMUNITY
End: 2019-06-26

## 2019-04-01 ENCOUNTER — HOSPITAL ENCOUNTER (OUTPATIENT)
Dept: GENERAL RADIOLOGY | Age: 54
Discharge: HOME | End: 2019-04-03
Payer: COMMERCIAL

## 2019-04-01 ENCOUNTER — OFFICE VISIT (OUTPATIENT)
Dept: PODIATRY | Age: 54
End: 2019-04-01
Payer: COMMERCIAL

## 2019-04-01 ENCOUNTER — HOSPITAL ENCOUNTER (OUTPATIENT)
Age: 54
Discharge: HOME | End: 2019-04-03
Payer: COMMERCIAL

## 2019-04-01 VITALS
WEIGHT: 176 LBS | DIASTOLIC BLOOD PRESSURE: 83 MMHG | HEIGHT: 64 IN | BODY MASS INDEX: 30.05 KG/M2 | HEART RATE: 83 BPM | SYSTOLIC BLOOD PRESSURE: 122 MMHG

## 2019-04-01 DIAGNOSIS — M79.675 PAIN OF TOE OF LEFT FOOT: ICD-10-CM

## 2019-04-01 DIAGNOSIS — M20.5X2 HALLUX LIMITUS OF LEFT FOOT: ICD-10-CM

## 2019-04-01 DIAGNOSIS — M21.42 PES PLANUS OF BOTH FEET: ICD-10-CM

## 2019-04-01 DIAGNOSIS — M20.12 HALLUX VALGUS, LEFT: Primary | ICD-10-CM

## 2019-04-01 DIAGNOSIS — M21.41 PES PLANUS OF BOTH FEET: ICD-10-CM

## 2019-04-01 DIAGNOSIS — M54.12 RADICULOPATHY, CERVICAL: ICD-10-CM

## 2019-04-01 DIAGNOSIS — M25.512 LEFT SHOULDER PAIN, UNSPECIFIED CHRONICITY: ICD-10-CM

## 2019-04-01 PROCEDURE — 73030 X-RAY EXAM OF SHOULDER: CPT

## 2019-04-01 PROCEDURE — 3017F COLORECTAL CA SCREEN DOC REV: CPT | Performed by: PODIATRIST

## 2019-04-01 PROCEDURE — G8427 DOCREV CUR MEDS BY ELIG CLIN: HCPCS | Performed by: PODIATRIST

## 2019-04-01 PROCEDURE — 4004F PT TOBACCO SCREEN RCVD TLK: CPT | Performed by: PODIATRIST

## 2019-04-01 PROCEDURE — 72040 X-RAY EXAM NECK SPINE 2-3 VW: CPT

## 2019-04-01 PROCEDURE — G8417 CALC BMI ABV UP PARAM F/U: HCPCS | Performed by: PODIATRIST

## 2019-04-01 PROCEDURE — 99203 OFFICE O/P NEW LOW 30 MIN: CPT | Performed by: PODIATRIST

## 2019-04-01 RX ORDER — CHOLECALCIFEROL (VITAMIN D3) 125 MCG
CAPSULE ORAL
Refills: 0 | COMMUNITY
Start: 2019-02-07

## 2019-04-01 RX ORDER — AMLODIPINE BESYLATE 10 MG/1
10 TABLET ORAL
COMMUNITY
Start: 2019-02-06

## 2019-04-01 RX ORDER — POLYETHYLENE GLYCOL 3350 17 G
POWDER IN PACKET (EA) ORAL
Refills: 0 | COMMUNITY
Start: 2019-02-27

## 2019-04-01 RX ORDER — CLONAZEPAM 0.5 MG/1
1 TABLET ORAL
COMMUNITY
Start: 2019-01-24

## 2019-04-01 RX ORDER — FLUTICASONE PROPIONATE 50 MCG
1 SPRAY, SUSPENSION (ML) NASAL DAILY
Refills: 0 | COMMUNITY
Start: 2019-01-24

## 2019-04-01 NOTE — PROGRESS NOTES
[address],  [address]  Tel: [phone]   Fax: [phone]    Subjective     CC: pain in left foot    HPI:  Enrico Rose is a 52y.o. year old female who presents to clinic today for pain in his left foot. Pain started about 6 months ago. Patient denies any trauma. Patient states that she feels better in shoes with some stretch. Patient has been taking ibuprofen which helps with the pain. Primary care physician is No primary care provider on file. .    ROS:    Constitutional: Denies nausea, vomiting, fever, chills. Neurologic: Denies numbness, tingling, and burning in the feet. Vascular: Denies symptoms of lower extremity claudication. Skin: Denies open wounds. Otherwise negative except as noted in the HPI.      PMH:  Past Medical History:   Diagnosis Date    Acid reflux disease     Anxiety     Arthralgia     Bipolar disorder ([address]     Brachial neuritis or radiculitis NOS 10/23/2012    Cigarette smoker     Degeneration of cervical intervertebral disc 10/23/2012    Depression     Essential hypertension 2018    Fibroid uterus     Hyperlipidemia     Mass     rt elbow    Periodic limb movement disorder     Substance abuse ([address]     H/O alcohol, crack cocaine, cannabis--in remission since        Surgical History:   Past Surgical History:   Procedure Laterality Date    BREAST BIOPSY      left breast - benign    CERVICAL SPINE SURGERY  2013    C6 C7 fusion      SECTION      DILATION AND CURETTAGE  2011 -- 12    ECTOPIC PREGNANCY SURGERY      left tube remains    HYSTEROSCOPY  11---   12    both with D&C    INDUCED       TUBAL LIGATION      UPPER GASTROINTESTINAL ENDOSCOPY      URETHRA SURGERY      VAGINA SURGERY      vaginal/rectum fistulum repair       Social History:  Social History     Tobacco Use    Smoking status: Current Every Day Smoker     Packs/day: 0.50     Years: 20.00     Pack years: 10.00     Types: Cigarettes    Smokeless tobacco: Never Used   Substance Use Topics    Alcohol use: No    Drug use: Yes     Types: Marijuana     Comment: last July 2018       Medications:  Prior to Admission medications    Medication Sig Start Date End Date Taking? Authorizing Provider   amLODIPine (NORVASC) 10 MG tablet Take 10 mg by mouth 2/6/19  Yes Historical Provider, MD   clonazePAM (KLONOPIN) 0.5 MG tablet Take 1 tablet by mouth. 1/24/19  Yes Historical Provider, MD   pregabalin (LYRICA) 50 MG capsule Take 50 mg by mouth 3 times daily. .   Yes Historical Provider, MD   valACYclovir (VALTREX) 500 MG tablet take 1 tablet by mouth twice a day 10/9/18  Yes SUZIE Gutierrez CNP   traZODone (DESYREL) 50 MG tablet Take 50 mg by mouth nightly   Yes Historical Provider, MD   Sucralfate (CARAFATE PO) Take by mouth   Yes Historical Provider, MD   Multiple Vitamins-Minerals (MULTIVITAMIN ADULT PO) Take 1 tablet by mouth   Yes Historical Provider, MD   omeprazole (PRILOSEC) 40 MG delayed release capsule  7/24/17  Yes Historical Provider, MD HORVATH VITAMIN D-3 5000 units CAPS capsule take 1 capsule by mouth once daily 2/7/19   Historical Provider, MD   fluticasone (FLONASE) 50 MCG/ACT nasal spray  1/24/19   Historical Provider, MD HORVATH LAXATIVE powder MIX 1 BOTTLE WITH 64 OUNCE OF GATORADE OR PROPEL WATER AND DRINK OVER 12 TO 24 HOURS 2/27/19   Historical Provider, MD   naproxen (NAPROSYN) 500 MG tablet Take 1 tablet by mouth 2 times daily (with meals) for 7 days 12/18/18 12/25/18  SUZIE Lopez CNP   potassium chloride 20 MEQ/15ML (10%) oral solution Take 15 mLs by mouth daily Take 1 dose daily for 2 days.  12/18/18   Silvia Blake MD   losartan (COZAAR) 25 MG tablet Take 1 tablet by mouth daily 12/10/18   Silvia Blake MD   cyclobenzaprine (FLEXERIL) 10 MG tablet Take 10 mg by mouth 3 times daily as needed for Muscle spasms    Historical Provider, MD       Objective Vitals:    04/01/19 1255   BP: 122/83   Pulse: 83       Lab Results   Component Value Date    LABA1C 6.1 08/28/2018       Physical Exam:  General:  Alert and oriented x3. In no acute distress. Lower Extremity Physical Exam:    Vascular: DP and PT pulses are 2/4 palpable, Bilateral. CFT <3 seconds to all digits, Bilateral.  No edema, Bilateral.  Hair growth is present to the level of the digits, Bilateral.     Neuro: Saph/sural/SP/DP/plantar sensation intact to light touch. Protective sensation is intact to 10/10 sites as tested with a 5.07g SWMF, Bilateral.     Musculoskeletal: EHL/FHL/GS/TA gross motor intact. Tenderness to palpation of left 1st metatarsal head. Lateral deviation of hallux kristopher, L>R. Dermatologic: No open lesions, Bilateral.  Interdigital maceration absent, Bilateral.  Nails 1-10 are normal in length and thickness bilateral.       Biomechanical Exam:   Right foot: 1st MPJ: 35L 45U  Right foot: 1st Ray: 6D 5P       Right foot: Ankle DF knee extended: -3  Right foot: Ankle DF knee flexed: 2    Left foot: 1st MPJ: 32L 40U  Left foot: 1st Ray: 6D 4P  Left foot: Ankle DF knee extended: -4  Left foot: Ankle DF knee flexed: 3    Gait Analysis: Non-antalgic, non-assisted. Imaging: Ordered. Assessment   Shravan Irene is a 52 y.o. female with     Diagnosis Orders   1. Hallux valgus, left  XR FOOT LEFT (MIN 3 VIEWS)   2. Hallux limitus of left foot  XR FOOT LEFT (MIN 3 VIEWS)   3. Pain of toe of left foot  XR FOOT LEFT (MIN 3 VIEWS)        Plan   · Patient examined and evaluated  · Diagnosis and treatment options discussed in detail  · Dispensed toe spacer. · Educated patient on importance of supportive shoe gear  · Ordered XR of left foot. · Will precert for orthotics. · Patient to RTC in 3 week(s)  · Please, call the office with any questions or concerns     No orders of the defined types were placed in this encounter.     Orders Placed This Encounter   Procedures    XR FOOT LEFT

## 2019-04-05 ENCOUNTER — TELEPHONE (OUTPATIENT)
Dept: PODIATRY | Age: 54
End: 2019-04-05

## 2019-05-30 ENCOUNTER — OFFICE VISIT (OUTPATIENT)
Dept: OBGYN CLINIC | Age: 54
End: 2019-05-30
Payer: COMMERCIAL

## 2019-05-30 VITALS
BODY MASS INDEX: 27.83 KG/M2 | HEART RATE: 86 BPM | SYSTOLIC BLOOD PRESSURE: 129 MMHG | WEIGHT: 163 LBS | HEIGHT: 64 IN | DIASTOLIC BLOOD PRESSURE: 90 MMHG | RESPIRATION RATE: 18 BRPM

## 2019-05-30 DIAGNOSIS — N95.0 POSTMENOPAUSAL BLEEDING: Primary | ICD-10-CM

## 2019-05-30 DIAGNOSIS — R19.8 ABDOMINAL FULLNESS: ICD-10-CM

## 2019-05-30 DIAGNOSIS — N93.9 ABNORMAL UTERINE BLEEDING (AUB): ICD-10-CM

## 2019-05-30 DIAGNOSIS — R30.9 PAINFUL URINATION: ICD-10-CM

## 2019-05-30 DIAGNOSIS — R14.0 ABDOMINAL BLOATING: ICD-10-CM

## 2019-05-30 DIAGNOSIS — R63.0 LOSS OF APPETITE: ICD-10-CM

## 2019-05-30 LAB
BILIRUBIN, POC: NORMAL
BLOOD URINE, POC: NORMAL
CLARITY, POC: NORMAL
COLOR, POC: NORMAL
GLUCOSE URINE, POC: NORMAL
KETONES, POC: NORMAL
LEUKOCYTE EST, POC: NORMAL
NITRITE, POC: NORMAL
PH, POC: 6.5
PROTEIN, POC: NORMAL
SPECIFIC GRAVITY, POC: 1.03
UROBILINOGEN, POC: NORMAL

## 2019-05-30 PROCEDURE — 3017F COLORECTAL CA SCREEN DOC REV: CPT | Performed by: CLINICAL NURSE SPECIALIST

## 2019-05-30 PROCEDURE — G8417 CALC BMI ABV UP PARAM F/U: HCPCS | Performed by: CLINICAL NURSE SPECIALIST

## 2019-05-30 PROCEDURE — 4004F PT TOBACCO SCREEN RCVD TLK: CPT | Performed by: CLINICAL NURSE SPECIALIST

## 2019-05-30 PROCEDURE — G8427 DOCREV CUR MEDS BY ELIG CLIN: HCPCS | Performed by: CLINICAL NURSE SPECIALIST

## 2019-05-30 PROCEDURE — 81002 URINALYSIS NONAUTO W/O SCOPE: CPT | Performed by: CLINICAL NURSE SPECIALIST

## 2019-05-30 PROCEDURE — 99214 OFFICE O/P EST MOD 30 MIN: CPT | Performed by: CLINICAL NURSE SPECIALIST

## 2019-05-30 RX ORDER — PAROXETINE HYDROCHLORIDE 25 MG/1
TABLET, FILM COATED, EXTENDED RELEASE ORAL
Refills: 0 | COMMUNITY
Start: 2019-05-16

## 2019-05-30 RX ORDER — LIDOCAINE AND PRILOCAINE 25; 25 MG/G; MG/G
1 CREAM TOPICAL PRN
COMMUNITY
Start: 2019-04-01

## 2019-05-30 RX ORDER — HYDROCODONE BITARTRATE AND ACETAMINOPHEN 5; 325 MG/1; MG/1
1 TABLET ORAL DAILY
COMMUNITY
Start: 2019-04-14 | End: 2021-02-02

## 2019-05-30 ASSESSMENT — ENCOUNTER SYMPTOMS
ALLERGIC/IMMUNOLOGIC NEGATIVE: 1
RESPIRATORY NEGATIVE: 1
GASTROINTESTINAL NEGATIVE: 1
EYES NEGATIVE: 1

## 2019-05-30 NOTE — PROGRESS NOTES
Sitting, Cuff Size: Medium Adult)   Pulse 86   Resp 18   Ht 5' 4\" (1.626 m)   Wt 163 lb (73.9 kg)   LMP 07/10/2012   BMI 27.98 kg/m²    Patient's last menstrual period was 07/10/2012. Objective:   Physical Exam   Constitutional: She is oriented to person, place, and time. She appears well-developed and well-nourished. HENT:   Head: Normocephalic and atraumatic. Eyes: Conjunctivae are normal.   Neck: Normal range of motion. Neck supple. Cardiovascular: Normal rate and regular rhythm. Pulmonary/Chest: Effort normal and breath sounds normal.   Abdominal: Bowel sounds are normal. There is no tenderness. Genitourinary: Vaginal discharge (blood in vaginal vault) found. Musculoskeletal: Normal range of motion. Neurological: She is oriented to person, place, and time. Skin: Skin is warm and dry. Psychiatric: She has a normal mood and affect. Her behavior is normal. Judgment and thought content normal.   Vitals reviewed. Assessment:      Diagnosis Orders   1. Postmenopausal bleeding  US Non OB Transvaginal    US Pelvis Complete   2. Painful urination  POCT Urinalysis no Micro    US Pelvis Complete   3. Abnormal uterine bleeding (AUB)  US Non OB Transvaginal    US Pelvis Complete   4. Abdominal bloating  US Non OB Transvaginal    US Pelvis Complete   5. Abdominal fullness  US Non OB Transvaginal    US Pelvis Complete   6. Loss of appetite  US Non OB Transvaginal    US Pelvis Complete           Plan:    Discussed with patient need for endosee vs D& C hysteroscopy and patient does not want endosee in office she would prefer going to the hospital for the procedure. Will need to pap at later date due to bleeding and patient is aware. Return for reschedule pap, schedule US soon. Patient was seen with total face to face time of 25 minutes. More than 50% of this visit was on counseling andeducation regarding the problems listed below and her options.  She was also counseled on her preventative health maintenance recommendations and follow-up.     Electronically signed by: Amanda Ramos CNP

## 2019-06-05 ENCOUNTER — TELEPHONE (OUTPATIENT)
Dept: OBGYN CLINIC | Age: 54
End: 2019-06-05

## 2019-06-05 DIAGNOSIS — N89.8 VAGINA ITCHING: Primary | ICD-10-CM

## 2019-06-05 DIAGNOSIS — N89.8 VAGINAL DISCHARGE: ICD-10-CM

## 2019-06-05 RX ORDER — FLUCONAZOLE 100 MG/1
100 TABLET ORAL DAILY
Qty: 7 TABLET | Refills: 0 | Status: SHIPPED | OUTPATIENT
Start: 2019-06-05 | End: 2019-06-12

## 2019-06-05 RX ORDER — METRONIDAZOLE 500 MG/1
500 TABLET ORAL 2 TIMES DAILY
Qty: 14 TABLET | Refills: 0 | Status: SHIPPED | OUTPATIENT
Start: 2019-06-05 | End: 2019-06-12

## 2019-06-18 ENCOUNTER — OFFICE VISIT (OUTPATIENT)
Dept: OBGYN CLINIC | Age: 54
End: 2019-06-18
Payer: COMMERCIAL

## 2019-06-18 VITALS
HEIGHT: 64 IN | WEIGHT: 163 LBS | BODY MASS INDEX: 27.83 KG/M2 | HEART RATE: 92 BPM | SYSTOLIC BLOOD PRESSURE: 125 MMHG | DIASTOLIC BLOOD PRESSURE: 89 MMHG

## 2019-06-18 DIAGNOSIS — R10.2 PELVIC PAIN IN FEMALE: ICD-10-CM

## 2019-06-18 DIAGNOSIS — N95.0 POSTMENOPAUSAL BLEEDING: ICD-10-CM

## 2019-06-18 DIAGNOSIS — D25.0 SUBMUCOUS UTERINE FIBROID: Primary | ICD-10-CM

## 2019-06-18 PROCEDURE — 99213 OFFICE O/P EST LOW 20 MIN: CPT | Performed by: SPECIALIST

## 2019-06-18 PROCEDURE — 4004F PT TOBACCO SCREEN RCVD TLK: CPT | Performed by: SPECIALIST

## 2019-06-18 PROCEDURE — G8427 DOCREV CUR MEDS BY ELIG CLIN: HCPCS | Performed by: SPECIALIST

## 2019-06-18 PROCEDURE — G8417 CALC BMI ABV UP PARAM F/U: HCPCS | Performed by: SPECIALIST

## 2019-06-18 PROCEDURE — 3017F COLORECTAL CA SCREEN DOC REV: CPT | Performed by: SPECIALIST

## 2019-06-18 ASSESSMENT — ENCOUNTER SYMPTOMS
APNEA: 0
VOMITING: 0
ABDOMINAL DISTENTION: 0
EYE PAIN: 0
ABDOMINAL PAIN: 0
NAUSEA: 0
DIARRHEA: 0
COUGH: 0
CONSTIPATION: 0

## 2019-06-18 NOTE — PROGRESS NOTES
Subjective:      Patient ID: Shelton Sutton is a 52 y.o. female. Chief Complaint   Patient presents with    Results     Patient is here today for her U/S results      /89 (Site: Left Upper Arm, Position: Sitting, Cuff Size: Medium Adult)   Pulse 92   Ht 5' 4\" (1.626 m)   Wt 163 lb (73.9 kg)   LMP 07/10/2012   BMI 27.98 kg/m²   Patient's last menstrual period was 07/10/2012.     Past Medical History:   Diagnosis Date    Acid reflux disease     Anxiety     Arthralgia     Bipolar disorder ([facility] [address]     Brachial neuritis or radiculitis NOS 10/23/2012    Cigarette smoker     Degeneration of cervical intervertebral disc 10/23/2012    Depression     Essential hypertension 2018    Fibroid uterus     Hyperlipidemia     Mass     rt elbow    Periodic limb movement disorder     Substance abuse ([facility] [address]     H/O alcohol, crack cocaine, cannabis--in remission since      Current Outpatient Medications Ordered in Epic   Medication Sig Dispense Refill    betamethasone valerate (VALISONE) 0.1 % ointment Apply topically      HYDROcodone-acetaminophen (NORCO) 5-325 MG per tablet Take 1 tablet by mouth.  PARoxetine (PAXIL CR) 25 MG extended release tablet take 1 tablet by mouth once daily  0    lidocaine-prilocaine (EMLA) 2.5-2.5 % cream       amLODIPine (NORVASC) 10 MG tablet Take 10 mg by mouth      RA VITAMIN D-3 5000 units CAPS capsule take 1 capsule by mouth once daily  0    clonazePAM (KLONOPIN) 0.5 MG tablet Take 1 tablet by mouth.  fluticasone (FLONASE) 50 MCG/ACT nasal spray   0    RA LAXATIVE powder MIX 1 BOTTLE WITH 64 OUNCE OF GATORADE OR PROPEL WATER AND DRINK OVER 12 TO 24 HOURS  0    pregabalin (LYRICA) 50 MG capsule Take 50 mg by mouth 3 times daily. Black Zambrano potassium chloride 20 MEQ/15ML (10%) oral solution Take 15 mLs by mouth daily Take 1 dose daily for 2 days.  30 mL 0    valACYclovir (VALTREX) 500 MG tablet take 1 tablet by mouth twice a day 30 tablet 0    and vaginal bleeding. Negative for difficulty urinating, dysuria and menstrual problem. Musculoskeletal: Negative for neck pain and neck stiffness. Skin: Negative. Neurological: Negative for light-headedness and numbness. Hematological: Negative. Does not bruise/bleed easily. Objective:   Physical Exam   Constitutional: She is oriented to person, place, and time. Vital signs are normal. She appears well-developed and well-nourished. HENT:   Head: Normocephalic and atraumatic. Neck: Normal range of motion. Neck supple. No thyromegaly present. Cardiovascular: Normal rate and regular rhythm. Pulmonary/Chest: Effort normal and breath sounds normal. She has no wheezes. Abdominal: Soft. Bowel sounds are normal. She exhibits no distension and no mass. There is no tenderness. There is no guarding. Musculoskeletal: Normal range of motion. Neurological: She is alert and oriented to person, place, and time. Skin: Skin is dry. Psychiatric: She has a normal mood and affect. Her behavior is normal. Thought content normal.   Nursing note and vitals reviewed. Assessment:      Patient with postmenopausal bleeding and pelvic pain. Ultrasound showing a fibroid that disrupts the endometrium was reviewed and explained to patient. Explained the need to obtain a sample. Will schedule patient for a hysteroscopy with D&C. Discussed surgical procedure, risks and benefits, and all questions were answered. Plan:      Schedule for hysteroscopy with D&C    Cyrus OAKLEY am scribing for, and in the presence of Dr. Jean Carlos Moreau. Electronically signed by: Cyrus Dodd 6/18/19 10:58 AM       I agree to the above documentation placed by my scribe Cyrus Dodd. I reviewed the scribe's note and agree with the documented findings and plan of care. Any areas of disagreement are noted on the chart. I have personally evaluated this patient. Additional findings are as noted.  I agree

## 2019-06-20 ENCOUNTER — TELEPHONE (OUTPATIENT)
Dept: OBGYN CLINIC | Age: 54
End: 2019-06-20

## 2019-06-26 ENCOUNTER — HOSPITAL ENCOUNTER (OUTPATIENT)
Dept: PREADMISSION TESTING | Age: 54
Discharge: HOME | End: 2019-06-30
Payer: COMMERCIAL

## 2019-06-26 VITALS
OXYGEN SATURATION: 100 % | RESPIRATION RATE: 16 BRPM | DIASTOLIC BLOOD PRESSURE: 88 MMHG | SYSTOLIC BLOOD PRESSURE: 132 MMHG | HEART RATE: 82 BPM | WEIGHT: 162 LBS | HEIGHT: 64 IN | BODY MASS INDEX: 27.66 KG/M2 | TEMPERATURE: 98.4 F

## 2019-06-26 LAB
ABSOLUTE EOS #: 0.1 K/UL (ref 0–0.4)
ABSOLUTE IMMATURE GRANULOCYTE: NORMAL K/UL (ref 0–0.3)
ABSOLUTE LYMPH #: 2.6 K/UL (ref 1–4.8)
ABSOLUTE MONO #: 0.6 K/UL (ref 0.1–1.3)
ANION GAP SERPL CALCULATED.3IONS-SCNC: 13 MMOL/L (ref 9–17)
BASOPHILS # BLD: 1 % (ref 0–2)
BASOPHILS ABSOLUTE: 0.1 K/UL (ref 0–0.2)
BUN BLDV-MCNC: 13 MG/DL (ref 6–20)
BUN/CREAT BLD: NORMAL (ref 9–20)
CALCIUM SERPL-MCNC: 9.9 MG/DL (ref 8.6–10.4)
CHLORIDE BLD-SCNC: 100 MMOL/L (ref 98–107)
CO2: 28 MMOL/L (ref 20–31)
CREAT SERPL-MCNC: 0.55 MG/DL (ref 0.5–0.9)
DIFFERENTIAL TYPE: NORMAL
EOSINOPHILS RELATIVE PERCENT: 1 % (ref 0–4)
GFR AFRICAN AMERICAN: >60 ML/MIN
GFR NON-AFRICAN AMERICAN: >60 ML/MIN
GFR SERPL CREATININE-BSD FRML MDRD: NORMAL ML/MIN/{1.73_M2}
GFR SERPL CREATININE-BSD FRML MDRD: NORMAL ML/MIN/{1.73_M2}
GLUCOSE BLD-MCNC: 98 MG/DL (ref 70–99)
HCT VFR BLD CALC: 44.3 % (ref 36–46)
HEMOGLOBIN: 14.6 G/DL (ref 12–16)
IMMATURE GRANULOCYTES: NORMAL %
LYMPHOCYTES # BLD: 32 % (ref 24–44)
MCH RBC QN AUTO: 29.2 PG (ref 26–34)
MCHC RBC AUTO-ENTMCNC: 32.9 G/DL (ref 31–37)
MCV RBC AUTO: 88.7 FL (ref 80–100)
MONOCYTES # BLD: 7 % (ref 1–7)
NRBC AUTOMATED: NORMAL PER 100 WBC
PDW BLD-RTO: 14.7 % (ref 11.5–14.9)
PLATELET # BLD: 363 K/UL (ref 150–450)
PLATELET ESTIMATE: NORMAL
PMV BLD AUTO: 8.1 FL (ref 6–12)
POTASSIUM SERPL-SCNC: 3.7 MMOL/L (ref 3.7–5.3)
RBC # BLD: 5 M/UL (ref 4–5.2)
RBC # BLD: NORMAL 10*6/UL
SEG NEUTROPHILS: 59 % (ref 36–66)
SEGMENTED NEUTROPHILS ABSOLUTE COUNT: 4.8 K/UL (ref 1.3–9.1)
SODIUM BLD-SCNC: 141 MMOL/L (ref 135–144)
WBC # BLD: 8.1 K/UL (ref 3.5–11)
WBC # BLD: NORMAL 10*3/UL

## 2019-06-26 PROCEDURE — 85025 COMPLETE CBC W/AUTO DIFF WBC: CPT

## 2019-06-26 PROCEDURE — 80048 BASIC METABOLIC PNL TOTAL CA: CPT

## 2019-06-26 PROCEDURE — 93005 ELECTROCARDIOGRAM TRACING: CPT | Performed by: ANESTHESIOLOGY

## 2019-06-26 PROCEDURE — 36415 COLL VENOUS BLD VENIPUNCTURE: CPT

## 2019-06-26 RX ORDER — LORATADINE 10 MG/1
10 CAPSULE, LIQUID FILLED ORAL DAILY
COMMUNITY

## 2019-06-26 NOTE — H&P
HISTORY and [address]       NAME:  Marie Torre  MRN: [id number]   YOB: 1965   Date: 2019   Age: 52 y.o. Gender: female       COMPLAINT AND PRESENT HISTORY:     Marie Torre is 52 y.o.,  female, here for HYSTEROSCOPY D & C. Has had  previous cystoscopy and D&C before. Patient has a history of abnormal bleeding for an isolated time of 7 days. Patient states that she had this episode 2 weeks ago (early  of this year)  patient reports that she has been menopausal since 5 years ago. Patient complains of left-sided lower abdominal pain. She states that she has had this left sided abdominal pain for quite some time and it is dull in sporadic. Patient complains of dysuria history of hematuria. Patient admits to dyspareunia. No fever/chills. PAST MEDICAL HISTORY     Past Medical History:   Diagnosis Date    Acid reflux disease     Anxiety     Arthralgia     Bipolar disorder ([facility] [address]     pt denies    Brachial neuritis or radiculitis NOS 10/23/2012    Cigarette smoker     Constipation     Degeneration of cervical intervertebral disc 10/23/2012    Depression     Essential hypertension 2018    Fibroid uterus     H/O hypokalemia     Hiatal hernia     History of blood transfusion     after     Hyperlipidemia     borderline, no medications currently    Mass     rt elbow    Periodic limb movement disorder     Post-menopausal bleeding     Sleep apnea     has machine, does not use    Substance abuse ([facility])     H/O alcohol, crack cocaine, cannabis--in remission since     Tachycardia     Urinary leakage     Wears glasses     Wears partial dentures     upper and lower       Pt denies any history of Diabetes mellitus type 2,  stroke, heart disease, COPD, Asthma, GERD,  Cancer, Seizures,Thyroid disease, Kidney Disease, Hepatitis, TB or Substance abuse.     SURGICAL HISTORY       Past Surgical History:   Procedure cyclobenzaprine (FLEXERIL) 10 MG tablet Take 10 mg by mouth 3 times daily as needed for Muscle spasms      omeprazole (PRILOSEC) 40 MG delayed release capsule Take 40 mg by mouth 2 times daily   0     No current facility-administered medications on file prior to encounter. General health:  Fairly good. No fever or chills. Skin:  No itching, redness or rash. HEENT:  No headache, epistaxis or sore throat. Neck:  No pain, stiffness or masses. Cardiovascular/Respiratory system:  No chest pain, palpitation or shortness of breath. Gastrointestinal tract: No abdominal pain, nausea, vomiting, diarrhea or constipation. Genitourinary: See HPI. Locomotor:  No bone or joint pains. No swelling. Neuropsychiatric:  No referable complaints. GENERAL PHYSICAL EXAM:     Vitals: /88   Pulse 82   Temp 98.4 °F (36.9 °C) (Oral)   Resp 16   Ht 5' 4\" (1.626 m)   Wt 162 lb (73.5 kg)   LMP 07/10/2012   SpO2 100%   BMI 27.81 kg/m²  Body mass index is 27.81 kg/m². GENERAL APPEARANCE:   Sheryle Bunch is 52 y.o.,  female, moderately obese, nourished, conscious, alert. Does not appear to be distress or pain at this time. SKIN:  Warm, dry, no cyanosis or jaundice. HEAD:  Normocephalic, atraumatic, no swelling or tenderness. EYES:  Pupils equal, reactive to light. EARS:  No discharge, no marked hearing loss. NOSE:  No rhinorrhea, epistaxis or septal deformity. THROAT:  Not congested. No ulceration bleeding or discharge. NECK:  No stiffness, trachea central.  No palpable masses or L.N.                 CHEST:  Symmetrical and equal on expansion. HEART:  RRR S1 > S2. No audible murmurs or gallops.                  LUNGS:  Equal on

## 2019-06-26 NOTE — H&P (VIEW-ONLY)
Laterality Date    BREAST BIOPSY      left breast - benign    CERVICAL SPINE SURGERY  2013    C6 C7 fusion      SECTION      COLONOSCOPY      removal 2 polyps     DILATION AND CURETTAGE  2011 -- 12    ECTOPIC PREGNANCY SURGERY      left tube remains    HYSTEROSCOPY  11---   12    both with D&C    INDUCED       TUBAL LIGATION      UPPER GASTROINTESTINAL ENDOSCOPY      URETHRA SURGERY      VAGINA SURGERY      vaginal/rectum fistulum repair       FAMILY HISTORY       Family History   Problem Relation Age of Onset    Diabetes Paternal Grandmother     Heart Disease Maternal Grandmother     Cancer Father         lung    Heart Disease Mother     Hypertension Mother     Hypertension Brother        SOCIAL HISTORY       Social History     Socioeconomic History    Marital status: Single     Spouse name: None    Number of children: None    Years of education: None    Highest education level: None   Occupational History    None   Social Needs    Financial resource strain: None    Food insecurity:     Worry: None     Inability: None    Transportation needs:     Medical: None     Non-medical: None   Tobacco Use    Smoking status: Current Every Day Smoker     Packs/day: 0.50     Years: 20.00     Pack years: 10.00     Types: Cigarettes    Smokeless tobacco: Never Used   Substance and Sexual Activity    Alcohol use: No    Drug use: Yes     Types: Marijuana     Comment: last 2019    Sexual activity: Not Currently   Lifestyle    Physical activity:     Days per week: None     Minutes per session: None    Stress: None   Relationships    Social connections:     Talks on phone: None     Gets together: None     Attends [employer] service: None     Active member of club or organization: None     Attends meetings of clubs or organizations: None     Relationship status: None    Intimate partner violence:     Fear of current or ex expansion, normal breath sounds. No adventitious sounds. ABDOMEN:  Obese. Soft on palpation. No localized tenderness. No guarding or rigidity. No palpable hepatosplenomegaly. LYMPHATICS:  No palpable cervical lymphadenopathy. LOCOMOTOR, BACK AND SPINE:  No tenderness or deformities. EXTREMITIES:  Symmetrical, no pretibial edema. Tonias sign negative. No discoloration or ulcerations. NEUROLOGIC:  The patient is conscious, alert, oriented,Cranial nerve II-XII intact, taste was not examined. No apparent focal sensory or motor deficits.                                                                                      PROVISIONAL DIAGNOSES / SURGERY:      Herpes     Acute vaginitis    HYSTEROSCOPY D & C    Patient Active Problem List    Diagnosis Date Noted    Cervical radiculopathy     Osteoarthritis of spine with radiculopathy, cervical region     Cervical fusion syndrome     Prediabetes 08/28/2018    Essential hypertension 01/18/2018    History of fusion of cervical spine 05/01/2013    Brachial neuritis or radiculitis 10/23/2012    Degeneration of cervical intervertebral disc 10/23/2012    Cervicalgia 10/23/2012    Hyperlipidemia 08/06/2012    Anxiety 04/27/2012    GERD (gastroesophageal reflux disease) 02/03/2012    Depression     Fibroid uterus     Substance abuse ([facility] [address]            HOLLIE HOANG, APRN - CNP on 6/26/2019 at 2:11 PM

## 2019-06-26 NOTE — PROGRESS NOTES
Patient's medical history and EKG results from today discussed with Dr. Jennifer Omer. Medical clearance requested per Dr. Jennifer Omer.

## 2019-06-27 ENCOUNTER — TELEPHONE (OUTPATIENT)
Dept: OBGYN CLINIC | Age: 54
End: 2019-06-27

## 2019-06-27 ENCOUNTER — ANESTHESIA EVENT (OUTPATIENT)
Dept: OPERATING ROOM | Age: 54
End: 2019-06-27
Payer: COMMERCIAL

## 2019-06-27 RX ORDER — SODIUM CHLORIDE 0.9 % (FLUSH) 0.9 %
10 SYRINGE (ML) INJECTION EVERY 12 HOURS SCHEDULED
Status: CANCELLED | OUTPATIENT
Start: 2019-06-27

## 2019-06-27 RX ORDER — SODIUM CHLORIDE, SODIUM LACTATE, POTASSIUM CHLORIDE, CALCIUM CHLORIDE 600; 310; 30; 20 MG/100ML; MG/100ML; MG/100ML; MG/100ML
INJECTION, SOLUTION INTRAVENOUS CONTINUOUS
Status: CANCELLED | OUTPATIENT
Start: 2019-06-27

## 2019-06-27 RX ORDER — LIDOCAINE HYDROCHLORIDE 10 MG/ML
1 INJECTION, SOLUTION EPIDURAL; INFILTRATION; INTRACAUDAL; PERINEURAL
Status: CANCELLED | OUTPATIENT
Start: 2019-06-27 | End: 2019-06-27

## 2019-06-27 RX ORDER — SODIUM CHLORIDE 0.9 % (FLUSH) 0.9 %
10 SYRINGE (ML) INJECTION PRN
Status: CANCELLED | OUTPATIENT
Start: 2019-06-27

## 2019-06-28 LAB
EKG ATRIAL RATE: 77 BPM
EKG P AXIS: 15 DEGREES
EKG P-R INTERVAL: 122 MS
EKG Q-T INTERVAL: 374 MS
EKG QRS DURATION: 82 MS
EKG QTC CALCULATION (BAZETT): 423 MS
EKG R AXIS: 62 DEGREES
EKG T AXIS: 24 DEGREES
EKG VENTRICULAR RATE: 77 BPM

## 2019-06-28 PROCEDURE — 93010 ELECTROCARDIOGRAM REPORT: CPT | Performed by: INTERNAL MEDICINE

## 2019-07-10 ENCOUNTER — HOSPITAL ENCOUNTER (OUTPATIENT)
Age: 54
Setting detail: OUTPATIENT SURGERY
Discharge: HOME | End: 2019-07-10
Attending: SPECIALIST | Admitting: SPECIALIST
Payer: COMMERCIAL

## 2019-07-10 ENCOUNTER — ANESTHESIA (OUTPATIENT)
Dept: OPERATING ROOM | Age: 54
End: 2019-07-10
Payer: COMMERCIAL

## 2019-07-10 VITALS
RESPIRATION RATE: 16 BRPM | WEIGHT: 162 LBS | BODY MASS INDEX: 27.66 KG/M2 | SYSTOLIC BLOOD PRESSURE: 120 MMHG | DIASTOLIC BLOOD PRESSURE: 70 MMHG | TEMPERATURE: 96.8 F | HEIGHT: 64 IN | HEART RATE: 95 BPM | OXYGEN SATURATION: 94 %

## 2019-07-10 VITALS — TEMPERATURE: 93.9 F | DIASTOLIC BLOOD PRESSURE: 71 MMHG | OXYGEN SATURATION: 93 % | SYSTOLIC BLOOD PRESSURE: 104 MMHG

## 2019-07-10 PROBLEM — Z98.890 STATUS POST HYSTEROSCOPY: Status: ACTIVE | Noted: 2019-07-10

## 2019-07-10 LAB
-: NORMAL
HCG, PREGNANCY URINE (POC): NEGATIVE

## 2019-07-10 PROCEDURE — 6360000002 HC RX W HCPCS

## 2019-07-10 PROCEDURE — 2709999900 HC NON-CHARGEABLE SUPPLY: Performed by: SPECIALIST

## 2019-07-10 PROCEDURE — 2580000003 HC RX 258: Performed by: ANESTHESIOLOGY

## 2019-07-10 PROCEDURE — 2720000010 HC SURG SUPPLY STERILE: Performed by: SPECIALIST

## 2019-07-10 PROCEDURE — 7100000000 HC PACU RECOVERY - FIRST 15 MIN: Performed by: SPECIALIST

## 2019-07-10 PROCEDURE — 3700000001 HC ADD 15 MINUTES (ANESTHESIA): Performed by: SPECIALIST

## 2019-07-10 PROCEDURE — 81025 URINE PREGNANCY TEST: CPT

## 2019-07-10 PROCEDURE — 7100000001 HC PACU RECOVERY - ADDTL 15 MIN: Performed by: SPECIALIST

## 2019-07-10 PROCEDURE — 58558 HYSTEROSCOPY BIOPSY: CPT | Performed by: SPECIALIST

## 2019-07-10 PROCEDURE — 2500000003 HC RX 250 WO HCPCS

## 2019-07-10 PROCEDURE — 3600000012 HC SURGERY LEVEL 2 ADDTL 15MIN: Performed by: SPECIALIST

## 2019-07-10 PROCEDURE — 3600000002 HC SURGERY LEVEL 2 BASE: Performed by: SPECIALIST

## 2019-07-10 PROCEDURE — 88305 TISSUE EXAM BY PATHOLOGIST: CPT

## 2019-07-10 PROCEDURE — 7100000031 HC ASPR PHASE II RECOVERY - ADDTL 15 MIN: Performed by: SPECIALIST

## 2019-07-10 PROCEDURE — 3700000000 HC ANESTHESIA ATTENDED CARE: Performed by: SPECIALIST

## 2019-07-10 PROCEDURE — 7100000030 HC ASPR PHASE II RECOVERY - FIRST 15 MIN: Performed by: SPECIALIST

## 2019-07-10 RX ORDER — OXYCODONE HYDROCHLORIDE AND ACETAMINOPHEN 5; 325 MG/1; MG/1
1 TABLET ORAL PRN
Status: DISCONTINUED | OUTPATIENT
Start: 2019-07-10 | End: 2019-07-10 | Stop reason: HOSPADM

## 2019-07-10 RX ORDER — LIDOCAINE HYDROCHLORIDE 10 MG/ML
INJECTION, SOLUTION EPIDURAL; INFILTRATION; INTRACAUDAL; PERINEURAL PRN
Status: DISCONTINUED | OUTPATIENT
Start: 2019-07-10 | End: 2019-07-10 | Stop reason: SDUPTHER

## 2019-07-10 RX ORDER — PROPOFOL 10 MG/ML
INJECTION, EMULSION INTRAVENOUS PRN
Status: DISCONTINUED | OUTPATIENT
Start: 2019-07-10 | End: 2019-07-10 | Stop reason: SDUPTHER

## 2019-07-10 RX ORDER — DIPHENHYDRAMINE HYDROCHLORIDE 50 MG/ML
12.5 INJECTION INTRAMUSCULAR; INTRAVENOUS
Status: DISCONTINUED | OUTPATIENT
Start: 2019-07-10 | End: 2019-07-10 | Stop reason: HOSPADM

## 2019-07-10 RX ORDER — MIDAZOLAM HYDROCHLORIDE 1 MG/ML
INJECTION INTRAMUSCULAR; INTRAVENOUS PRN
Status: DISCONTINUED | OUTPATIENT
Start: 2019-07-10 | End: 2019-07-10 | Stop reason: SDUPTHER

## 2019-07-10 RX ORDER — FENTANYL CITRATE 50 UG/ML
INJECTION, SOLUTION INTRAMUSCULAR; INTRAVENOUS PRN
Status: DISCONTINUED | OUTPATIENT
Start: 2019-07-10 | End: 2019-07-10 | Stop reason: SDUPTHER

## 2019-07-10 RX ORDER — SODIUM CHLORIDE 0.9 % (FLUSH) 0.9 %
10 SYRINGE (ML) INJECTION PRN
Status: DISCONTINUED | OUTPATIENT
Start: 2019-07-10 | End: 2019-07-10 | Stop reason: HOSPADM

## 2019-07-10 RX ORDER — SODIUM CHLORIDE, SODIUM LACTATE, POTASSIUM CHLORIDE, CALCIUM CHLORIDE 600; 310; 30; 20 MG/100ML; MG/100ML; MG/100ML; MG/100ML
INJECTION, SOLUTION INTRAVENOUS CONTINUOUS
Status: DISCONTINUED | OUTPATIENT
Start: 2019-07-10 | End: 2019-07-10 | Stop reason: HOSPADM

## 2019-07-10 RX ORDER — MEPERIDINE HYDROCHLORIDE 50 MG/ML
12.5 INJECTION INTRAMUSCULAR; INTRAVENOUS; SUBCUTANEOUS EVERY 5 MIN PRN
Status: DISCONTINUED | OUTPATIENT
Start: 2019-07-10 | End: 2019-07-10 | Stop reason: HOSPADM

## 2019-07-10 RX ORDER — SODIUM CHLORIDE 0.9 % (FLUSH) 0.9 %
10 SYRINGE (ML) INJECTION EVERY 12 HOURS SCHEDULED
Status: DISCONTINUED | OUTPATIENT
Start: 2019-07-10 | End: 2019-07-10 | Stop reason: HOSPADM

## 2019-07-10 RX ORDER — OXYCODONE HYDROCHLORIDE AND ACETAMINOPHEN 5; 325 MG/1; MG/1
2 TABLET ORAL PRN
Status: DISCONTINUED | OUTPATIENT
Start: 2019-07-10 | End: 2019-07-10 | Stop reason: HOSPADM

## 2019-07-10 RX ORDER — PROMETHAZINE HYDROCHLORIDE 25 MG/ML
6.25 INJECTION, SOLUTION INTRAMUSCULAR; INTRAVENOUS
Status: DISCONTINUED | OUTPATIENT
Start: 2019-07-10 | End: 2019-07-10 | Stop reason: SDUPTHER

## 2019-07-10 RX ORDER — LABETALOL 20 MG/4 ML (5 MG/ML) INTRAVENOUS SYRINGE
5 EVERY 10 MIN PRN
Status: DISCONTINUED | OUTPATIENT
Start: 2019-07-10 | End: 2019-07-10 | Stop reason: HOSPADM

## 2019-07-10 RX ORDER — DEXAMETHASONE SODIUM PHOSPHATE 4 MG/ML
INJECTION, SOLUTION INTRA-ARTICULAR; INTRALESIONAL; INTRAMUSCULAR; INTRAVENOUS; SOFT TISSUE PRN
Status: DISCONTINUED | OUTPATIENT
Start: 2019-07-10 | End: 2019-07-10 | Stop reason: SDUPTHER

## 2019-07-10 RX ORDER — LIDOCAINE HYDROCHLORIDE 10 MG/ML
1 INJECTION, SOLUTION EPIDURAL; INFILTRATION; INTRACAUDAL; PERINEURAL
Status: DISCONTINUED | OUTPATIENT
Start: 2019-07-10 | End: 2019-07-10 | Stop reason: HOSPADM

## 2019-07-10 RX ORDER — ONDANSETRON 2 MG/ML
INJECTION INTRAMUSCULAR; INTRAVENOUS PRN
Status: DISCONTINUED | OUTPATIENT
Start: 2019-07-10 | End: 2019-07-10 | Stop reason: SDUPTHER

## 2019-07-10 RX ADMIN — PROPOFOL 200 MG: 10 INJECTION, EMULSION INTRAVENOUS at 09:28

## 2019-07-10 RX ADMIN — PROPOFOL 50 MG: 10 INJECTION, EMULSION INTRAVENOUS at 09:32

## 2019-07-10 RX ADMIN — DEXAMETHASONE SODIUM PHOSPHATE 4 MG: 4 INJECTION, SOLUTION INTRA-ARTICULAR; INTRALESIONAL; INTRAMUSCULAR; INTRAVENOUS; SOFT TISSUE at 09:36

## 2019-07-10 RX ADMIN — SODIUM CHLORIDE, POTASSIUM CHLORIDE, SODIUM LACTATE AND CALCIUM CHLORIDE: 600; 310; 30; 20 INJECTION, SOLUTION INTRAVENOUS at 07:45

## 2019-07-10 RX ADMIN — LIDOCAINE HYDROCHLORIDE 50 MG: 10 INJECTION, SOLUTION EPIDURAL; INFILTRATION; INTRACAUDAL; PERINEURAL at 09:28

## 2019-07-10 RX ADMIN — ONDANSETRON 4 MG: 2 INJECTION INTRAMUSCULAR; INTRAVENOUS at 09:36

## 2019-07-10 RX ADMIN — MIDAZOLAM 2 MG: 1 INJECTION INTRAMUSCULAR; INTRAVENOUS at 09:22

## 2019-07-10 RX ADMIN — FENTANYL CITRATE 100 MCG: 50 INJECTION, SOLUTION INTRAMUSCULAR; INTRAVENOUS at 09:32

## 2019-07-10 RX ADMIN — PROPOFOL 100 MG: 10 INJECTION, EMULSION INTRAVENOUS at 09:30

## 2019-07-10 ASSESSMENT — PULMONARY FUNCTION TESTS
PIF_VALUE: 35
PIF_VALUE: 14
PIF_VALUE: 16
PIF_VALUE: 16
PIF_VALUE: 0
PIF_VALUE: 3
PIF_VALUE: 28
PIF_VALUE: 14
PIF_VALUE: 22
PIF_VALUE: 16
PIF_VALUE: 2
PIF_VALUE: 14
PIF_VALUE: 14
PIF_VALUE: 2
PIF_VALUE: 14
PIF_VALUE: 6
PIF_VALUE: 14
PIF_VALUE: 37
PIF_VALUE: 16
PIF_VALUE: 14
PIF_VALUE: 36
PIF_VALUE: 2
PIF_VALUE: 14
PIF_VALUE: 0
PIF_VALUE: 16
PIF_VALUE: 10
PIF_VALUE: 14
PIF_VALUE: 14
PIF_VALUE: 16
PIF_VALUE: 14
PIF_VALUE: 14
PIF_VALUE: 3
PIF_VALUE: 14
PIF_VALUE: 14
PIF_VALUE: 10
PIF_VALUE: 14
PIF_VALUE: 4
PIF_VALUE: 17
PIF_VALUE: 16
PIF_VALUE: 0
PIF_VALUE: 31
PIF_VALUE: 15
PIF_VALUE: 33
PIF_VALUE: 20
PIF_VALUE: 0
PIF_VALUE: 14

## 2019-07-10 ASSESSMENT — PAIN - FUNCTIONAL ASSESSMENT: PAIN_FUNCTIONAL_ASSESSMENT: 0-10

## 2019-07-10 ASSESSMENT — PAIN SCALES - GENERAL
PAINLEVEL_OUTOF10: 0

## 2019-07-10 ASSESSMENT — PAIN DESCRIPTION - DESCRIPTORS: DESCRIPTORS: ACHING

## 2019-07-10 NOTE — H&P
movement disorder, Post-menopausal bleeding, Sleep apnea, Substance abuse ([facility] [address] Tachycardia, Urinary leakage, Wears glasses, and Wears partial dentures. PAST SURGICAL HISTORY:   has a past surgical history that includes  section (); Tubal ligation (); Induced  (); Ectopic pregnancy surgery (); Urethra surgery (); Vagina surgery (); Breast biopsy (); Dilation & curettage (2011 -- 12); hysteroscopy (11---   12); Cervical spine surgery (2013); Upper gastrointestinal endoscopy; and Colonoscopy.     ALLERGIES:  Allergies as of 2019 - Review Complete 2019   Allergen Reaction Noted    Demeclocycline Swelling 2017    Sertraline Rash 2017    Lisinopril Other (See Comments) 12/10/2018    Pravastatin Other (See Comments) 2018    Seasonal  2017    Tetracyclines & related Swelling 2011    Bactrim Nausea And Vomiting 2011       MEDICATIONS:  Current Facility-Administered Medications   Medication Dose Route Frequency Provider Last Rate Last Dose    lactated ringers infusion   Intravenous Continuous Patience Stewart  mL/hr at 07/10/19 0745      lidocaine PF 1 % injection 1 mL  1 mL Intradermal Once PRN Patience Stewart MD        sodium chloride flush 0.9 % injection 10 mL  10 mL Intravenous 2 times per day Patience Stewart MD        sodium chloride flush 0.9 % injection 10 mL  10 mL Intravenous PRN Patience Stewart MD        HYDROmorphone (DILAUDID) injection 0.25 mg  0.25 mg Intravenous Q5 Min PRN Patience Stewart MD        HYDROmorphone (DILAUDID) injection 0.5 mg  0.5 mg Intravenous Q5 Min PRN Patience Stewart MD        HYDROmorphone (DILAUDID) injection 0.25 mg  0.25 mg Intravenous Q5 Min PRN Patience Stewart MD        HYDROmorphone (DILAUDID) injection 0.5 mg  0.5 mg Intravenous Q5 Min PRN Patience Stewart MD        oxyCODONE-acetaminophen (PERCOCET) 5-325 MG per tablet 1 tablet  1 tablet Oral PRN Marce Donnelly counseled on the procedure. Risks and complications were reviewed in detail. The patients orders, labs, consents have been completed. The history and physical as well as all supporting surgical documentation will be forwarded to the pre-operative holding area.     The patient is aware that this procedure may not alleviate her symptoms. That there may be a necessity for a second surgery and that there may be an incomplete removal of abnormal tissue.     Jessi Baker DO  Ob/Gyn Resident  Pager: [phone]  Abel Brook[place]ell [facility] [address]  7/10/2019, 8:07 AM

## 2019-07-10 NOTE — ANESTHESIA PRE PROCEDURE
daily as needed Take 1 dose daily for 2 days. 12/18/18   John Her MD   valACYclovir (VALTREX) 500 MG tablet take 1 tablet by mouth twice a day  Patient taking differently: take 1 tablet by mouth twice a day as needed 10/9/18   SUZIE Peoples CNP   Sucralfate (CARAFATE PO) Take by mouth    Historical Provider, MD   cyclobenzaprine (FLEXERIL) 10 MG tablet Take 10 mg by mouth 3 times daily as needed for Muscle spasms    Historical Provider, MD       Current medications:    Current Facility-Administered Medications   Medication Dose Route Frequency Provider Last Rate Last Dose    lactated ringers infusion   Intravenous Continuous Dani Max MD        lidocaine PF 1 % injection 1 mL  1 mL Intradermal Once PRN Dani Max MD        sodium chloride flush 0.9 % injection 10 mL  10 mL Intravenous 2 times per day Dani Max MD        sodium chloride flush 0.9 % injection 10 mL  10 mL Intravenous PRN Dani Max MD           Allergies:     Allergies   Allergen Reactions    Demeclocycline Swelling     Throat swelling    Sertraline Rash    Lisinopril Other (See Comments)     Cough    Pravastatin Other (See Comments)     Muscle cramps    Seasonal     Tetracyclines & Related Swelling     Throat swelling    Bactrim Nausea And Vomiting       Problem List:    Patient Active Problem List   Diagnosis Code    Depression F32.9    Fibroid uterus D25.9    Substance abuse ([facility] [address] F19.10    GERD (gastroesophageal reflux disease) K21.9    Anxiety F41.9    Hyperlipidemia E78.5    Brachial neuritis or radiculitis M54.12    Degeneration of cervical intervertebral disc M50.30    Cervicalgia M54.2    History of fusion of cervical spine Z98.1    Essential hypertension I10    Prediabetes R73.03    Osteoarthritis of spine with radiculopathy, cervical region M47.22    Cervical fusion syndrome Q76.1    Cervical radiculopathy M54.12       Past Medical History:        Diagnosis Date    Acid reflux

## 2019-07-10 NOTE — INTERVAL H&P NOTE
HISTORY and [facility] [address] [address]       NAME:  Mara Toussaint  MRN: [id number]   YOB: 1965   Date: 7/10/2019   Age: 52 y.o. Gender: female     H&P Update Note    H&P from 06/26/2019  reviewed and updated, Patient examined. Vitals: /72   Pulse 73   Temp 97.3 °F (36.3 °C) (Oral)   Resp 16   Ht 5' 4\" (1.626 m)   Wt 162 lb (73.5 kg)   LMP 07/10/2012   SpO2 98%   BMI 27.81 kg/m²  Body mass index is 27.81 kg/m². No interval changes. I concur with the findings.        DULCE MARIA PUENTE PA-C on 7/10/2019 at 8:09 AM

## 2019-07-11 LAB — SURGICAL PATHOLOGY REPORT: NORMAL

## 2019-07-19 ENCOUNTER — OFFICE VISIT (OUTPATIENT)
Dept: OBGYN CLINIC | Age: 54
End: 2019-07-19

## 2019-07-19 VITALS
SYSTOLIC BLOOD PRESSURE: 123 MMHG | HEIGHT: 64 IN | BODY MASS INDEX: 28 KG/M2 | DIASTOLIC BLOOD PRESSURE: 86 MMHG | WEIGHT: 164 LBS | HEART RATE: 89 BPM

## 2019-07-19 DIAGNOSIS — N84.0 ENDOMETRIAL POLYP: Primary | ICD-10-CM

## 2019-07-19 PROCEDURE — 99024 POSTOP FOLLOW-UP VISIT: CPT | Performed by: SPECIALIST

## 2019-07-19 ASSESSMENT — ENCOUNTER SYMPTOMS
DIARRHEA: 0
EYE PAIN: 0
VOMITING: 0
CONSTIPATION: 0
ABDOMINAL PAIN: 0
APNEA: 0
COUGH: 0
ABDOMINAL DISTENTION: 0
NAUSEA: 0

## 2019-09-30 ENCOUNTER — TELEPHONE (OUTPATIENT)
Dept: OBGYN CLINIC | Age: 54
End: 2019-09-30

## 2019-09-30 RX ORDER — FLUCONAZOLE 100 MG/1
100 TABLET ORAL DAILY
Qty: 7 TABLET | Refills: 0 | Status: SHIPPED | OUTPATIENT
Start: 2019-09-30 | End: 2019-10-07

## 2019-09-30 RX ORDER — METRONIDAZOLE 500 MG/1
500 TABLET ORAL 2 TIMES DAILY
Qty: 14 TABLET | Refills: 0 | Status: SHIPPED | OUTPATIENT
Start: 2019-09-30 | End: 2019-10-07

## 2019-10-24 ENCOUNTER — OFFICE VISIT (OUTPATIENT)
Dept: ORTHOPEDIC SURGERY | Age: 54
End: 2019-10-24
Payer: COMMERCIAL

## 2019-10-24 DIAGNOSIS — M54.12 CERVICAL RADICULOPATHY: Primary | ICD-10-CM

## 2019-10-24 DIAGNOSIS — M75.42 IMPINGEMENT SYNDROME OF LEFT SHOULDER: ICD-10-CM

## 2019-10-24 PROCEDURE — 3017F COLORECTAL CA SCREEN DOC REV: CPT | Performed by: ORTHOPAEDIC SURGERY

## 2019-10-24 PROCEDURE — 4004F PT TOBACCO SCREEN RCVD TLK: CPT | Performed by: ORTHOPAEDIC SURGERY

## 2019-10-24 PROCEDURE — 96372 THER/PROPH/DIAG INJ SC/IM: CPT | Performed by: ORTHOPAEDIC SURGERY

## 2019-10-24 PROCEDURE — G8417 CALC BMI ABV UP PARAM F/U: HCPCS | Performed by: ORTHOPAEDIC SURGERY

## 2019-10-24 PROCEDURE — G8484 FLU IMMUNIZE NO ADMIN: HCPCS | Performed by: ORTHOPAEDIC SURGERY

## 2019-10-24 PROCEDURE — G8427 DOCREV CUR MEDS BY ELIG CLIN: HCPCS | Performed by: ORTHOPAEDIC SURGERY

## 2019-10-24 PROCEDURE — 99213 OFFICE O/P EST LOW 20 MIN: CPT | Performed by: ORTHOPAEDIC SURGERY

## 2019-10-24 RX ORDER — BUPIVACAINE HYDROCHLORIDE 5 MG/ML
2 INJECTION, SOLUTION PERINEURAL ONCE
Status: COMPLETED | OUTPATIENT
Start: 2019-10-24 | End: 2019-10-24

## 2019-10-24 RX ORDER — LIDOCAINE HYDROCHLORIDE 10 MG/ML
2 INJECTION, SOLUTION EPIDURAL; INFILTRATION; INTRACAUDAL; PERINEURAL ONCE
Status: COMPLETED | OUTPATIENT
Start: 2019-10-24 | End: 2019-10-24

## 2019-10-24 RX ORDER — BETAMETHASONE SODIUM PHOSPHATE AND BETAMETHASONE ACETATE 3; 3 MG/ML; MG/ML
12 INJECTION, SUSPENSION INTRA-ARTICULAR; INTRALESIONAL; INTRAMUSCULAR; SOFT TISSUE ONCE
Status: COMPLETED | OUTPATIENT
Start: 2019-10-24 | End: 2019-10-24

## 2019-10-24 RX ADMIN — BUPIVACAINE HYDROCHLORIDE 10 MG: 5 INJECTION, SOLUTION PERINEURAL at 16:16

## 2019-10-24 RX ADMIN — BETAMETHASONE SODIUM PHOSPHATE AND BETAMETHASONE ACETATE 12 MG: 3; 3 INJECTION, SUSPENSION INTRA-ARTICULAR; INTRALESIONAL; INTRAMUSCULAR; SOFT TISSUE at 16:15

## 2019-10-24 RX ADMIN — LIDOCAINE HYDROCHLORIDE 2 ML: 10 INJECTION, SOLUTION EPIDURAL; INFILTRATION; INTRACAUDAL; PERINEURAL at 16:16

## 2019-12-17 ENCOUNTER — OFFICE VISIT (OUTPATIENT)
Dept: ORTHOPEDIC SURGERY | Age: 54
End: 2019-12-17
Payer: COMMERCIAL

## 2019-12-17 PROCEDURE — 99213 OFFICE O/P EST LOW 20 MIN: CPT | Performed by: ORTHOPAEDIC SURGERY

## 2019-12-17 PROCEDURE — G8427 DOCREV CUR MEDS BY ELIG CLIN: HCPCS | Performed by: ORTHOPAEDIC SURGERY

## 2019-12-17 PROCEDURE — 3017F COLORECTAL CA SCREEN DOC REV: CPT | Performed by: ORTHOPAEDIC SURGERY

## 2019-12-17 PROCEDURE — 4004F PT TOBACCO SCREEN RCVD TLK: CPT | Performed by: ORTHOPAEDIC SURGERY

## 2019-12-17 PROCEDURE — G8484 FLU IMMUNIZE NO ADMIN: HCPCS | Performed by: ORTHOPAEDIC SURGERY

## 2019-12-17 PROCEDURE — G8417 CALC BMI ABV UP PARAM F/U: HCPCS | Performed by: ORTHOPAEDIC SURGERY

## 2019-12-18 ENCOUNTER — APPOINTMENT (OUTPATIENT)
Dept: GENERAL RADIOLOGY | Age: 54
End: 2019-12-18
Payer: COMMERCIAL

## 2019-12-18 ENCOUNTER — HOSPITAL ENCOUNTER (EMERGENCY)
Age: 54
Discharge: HOME | End: 2019-12-18
Attending: EMERGENCY MEDICINE
Payer: COMMERCIAL

## 2019-12-18 VITALS
BODY MASS INDEX: 27.9 KG/M2 | OXYGEN SATURATION: 99 % | HEART RATE: 95 BPM | RESPIRATION RATE: 16 BRPM | TEMPERATURE: 98.5 F | WEIGHT: 163.4 LBS | SYSTOLIC BLOOD PRESSURE: 136 MMHG | DIASTOLIC BLOOD PRESSURE: 91 MMHG | HEIGHT: 64 IN

## 2019-12-18 DIAGNOSIS — S16.1XXA STRAIN OF NECK MUSCLE, INITIAL ENCOUNTER: Primary | ICD-10-CM

## 2019-12-18 DIAGNOSIS — S29.012A STRAIN OF THORACIC BACK REGION: ICD-10-CM
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
## 2019-12-18 PROCEDURE — 99283 EMERGENCY DEPT VISIT LOW MDM: CPT

## 2019-12-18 PROCEDURE — 73030 X-RAY EXAM OF SHOULDER: CPT

## 2019-12-18 PROCEDURE — 72040 X-RAY EXAM NECK SPINE 2-3 VW: CPT

## 2019-12-18 PROCEDURE — 72072 X-RAY EXAM THORAC SPINE 3VWS: CPT

## 2019-12-18 RX ORDER — METHOCARBAMOL 750 MG/1
750 TABLET, FILM COATED ORAL 4 TIMES DAILY
Qty: 40 TABLET | Refills: 0 | Status: SHIPPED | OUTPATIENT
Start: 2019-12-18 | End: 2019-12-28

## 2019-12-18 RX ORDER — NAPROXEN 500 MG/1
500 TABLET ORAL 2 TIMES DAILY WITH MEALS
Qty: 20 TABLET | Refills: 0 | Status: SHIPPED | OUTPATIENT
Start: 2019-12-18 | End: 2019-12-18

## 2019-12-18 RX ORDER — IBUPROFEN 600 MG/1
600 TABLET ORAL EVERY 6 HOURS PRN
Qty: 15 TABLET | Refills: 0 | Status: SHIPPED | OUTPATIENT
Start: 2019-12-18

## 2019-12-18 ASSESSMENT — PAIN DESCRIPTION - DESCRIPTORS: DESCRIPTORS: TIGHTNESS

## 2019-12-18 ASSESSMENT — PAIN SCALES - GENERAL: PAINLEVEL_OUTOF10: 7

## 2019-12-18 ASSESSMENT — PAIN DESCRIPTION - LOCATION: LOCATION: HEAD;BACK

## 2019-12-18 ASSESSMENT — PAIN DESCRIPTION - FREQUENCY: FREQUENCY: CONTINUOUS

## 2020-01-24 ENCOUNTER — OFFICE VISIT (OUTPATIENT)
Dept: OBGYN CLINIC | Age: 55
End: 2020-01-24
Payer: COMMERCIAL

## 2020-01-24 VITALS
HEART RATE: 92 BPM | HEIGHT: 64 IN | WEIGHT: 165 LBS | DIASTOLIC BLOOD PRESSURE: 87 MMHG | BODY MASS INDEX: 28.17 KG/M2 | SYSTOLIC BLOOD PRESSURE: 125 MMHG

## 2020-01-24 PROBLEM — N84.0 ENDOMETRIAL POLYP: Status: ACTIVE | Noted: 2020-01-24

## 2020-01-24 PROCEDURE — G8484 FLU IMMUNIZE NO ADMIN: HCPCS | Performed by: SPECIALIST

## 2020-01-24 PROCEDURE — 3017F COLORECTAL CA SCREEN DOC REV: CPT | Performed by: SPECIALIST

## 2020-01-24 PROCEDURE — G8427 DOCREV CUR MEDS BY ELIG CLIN: HCPCS | Performed by: SPECIALIST

## 2020-01-24 PROCEDURE — 99212 OFFICE O/P EST SF 10 MIN: CPT | Performed by: SPECIALIST

## 2020-01-24 PROCEDURE — 4004F PT TOBACCO SCREEN RCVD TLK: CPT | Performed by: SPECIALIST

## 2020-01-24 PROCEDURE — G8417 CALC BMI ABV UP PARAM F/U: HCPCS | Performed by: SPECIALIST

## 2020-01-24 ASSESSMENT — ENCOUNTER SYMPTOMS
APNEA: 0
CONSTIPATION: 0
VOMITING: 0
COUGH: 0
ABDOMINAL PAIN: 0
DIARRHEA: 0
EYE PAIN: 0
ABDOMINAL DISTENTION: 0
NAUSEA: 0

## 2020-01-24 NOTE — PROGRESS NOTES
Subjective:      Patient ID: Raheem Prince is a 62 y.o. female. Chief Complaint   Patient presents with    Follow-up     6 Month follow up for Hyst with D&C. She had polyps and is returning for a follow up ultrasound order. She has no issues or concerns at this time. /87 (Site: Right Upper Arm, Position: Sitting, Cuff Size: Medium Adult)   Pulse 92   Ht 5' 4\" (1.626 m)   Wt 165 lb (74.8 kg)   LMP 07/10/2012   BMI 28.32 kg/m²   Patient's last menstrual period was 07/10/2012.     Past Medical History:   Diagnosis Date    Acid reflux disease     Anxiety     Arthralgia     Bipolar disorder ([facility] [address]     pt denies    Brachial neuritis or radiculitis NOS 10/23/2012    Cigarette smoker     Constipation     Degeneration of cervical intervertebral disc 10/23/2012    Depression     Essential hypertension 2018    Fibroid uterus     H/O hypokalemia     Hiatal hernia     History of blood transfusion     after     Hyperlipidemia     borderline, no medications currently    Mass     rt elbow    Periodic limb movement disorder     Post-menopausal bleeding     Sleep apnea     has machine, does not use    Substance abuse ([facility])     H/O alcohol, crack cocaine, cannabis--in remission since     Tachycardia     Urinary leakage     Wears glasses     Wears partial dentures     upper and lower     Current Outpatient Medications Ordered in Epic   Medication Sig Dispense Refill    ibuprofen (ADVIL;MOTRIN) 600 MG tablet Take 1 tablet by mouth every 6 hours as needed for Pain 15 tablet 0    loratadine (CLARITIN) 10 MG capsule Take 10 mg by mouth daily      betamethasone valerate (VALISONE) 0.1 % ointment Apply topically      HYDROcodone-acetaminophen (NORCO) 5-325 MG per tablet Take 1 tablet by mouth daily.        PARoxetine (PAXIL CR) 25 MG extended release tablet take 1 tablet by mouth once daily  0    lidocaine-prilocaine (EMLA) 2.5-2.5 % cream Apply 1 each topically as Order Specific Question:   Reason for exam:     Answer:   abnormal bleeding        Patient is here today following a hysteroscopy D&C 6 months ago for endometrial polyps. Patient denies any history of bleeding since the procedure. Patient also denies any pelvic pain since the procedure. She denies nausea, vomiting and fever. Review of Systems   Constitutional: Negative for activity change, appetite change and fever. HENT: Negative for ear discharge and ear pain. Eyes: Negative for pain and visual disturbance. Respiratory: Negative for apnea and cough. Cardiovascular: Negative for chest pain, palpitations and leg swelling. Gastrointestinal: Negative for abdominal distention, abdominal pain, constipation, diarrhea, nausea and vomiting. Endocrine: Negative. Genitourinary: Negative for difficulty urinating, dysuria, menstrual problem, pelvic pain and vaginal bleeding. Musculoskeletal: Negative for neck pain and neck stiffness. Skin: Negative. Neurological: Negative for light-headedness and numbness. Hematological: Negative. Does not bruise/bleed easily. Objective:   Physical Exam  Vitals signs and nursing note reviewed. Constitutional:       Appearance: She is well-developed. HENT:      Head: Normocephalic and atraumatic. Neck:      Musculoskeletal: Normal range of motion and neck supple. Thyroid: No thyromegaly. Cardiovascular:      Rate and Rhythm: Normal rate and regular rhythm. Pulmonary:      Effort: Pulmonary effort is normal.      Breath sounds: Normal breath sounds. No wheezing. Abdominal:      General: Bowel sounds are normal. There is no distension. Palpations: Abdomen is soft. There is no mass. Tenderness: There is no tenderness. There is no guarding. Musculoskeletal: Normal range of motion. Skin:     General: Skin is dry. Neurological:      Mental Status: She is alert and oriented to person, place, and time.    Psychiatric:

## 2020-02-07 ENCOUNTER — TELEPHONE (OUTPATIENT)
Dept: OBGYN CLINIC | Age: 55
End: 2020-02-07

## 2020-02-07 RX ORDER — FLUCONAZOLE 100 MG/1
100 TABLET ORAL DAILY
Qty: 7 TABLET | Refills: 0 | Status: SHIPPED | OUTPATIENT
Start: 2020-02-07 | End: 2020-02-14

## 2020-02-12 ENCOUNTER — TELEPHONE (OUTPATIENT)
Dept: ORTHOPEDIC SURGERY | Age: 55
End: 2020-02-12

## 2020-02-17 RX ORDER — FLUCONAZOLE 100 MG/1
100 TABLET ORAL DAILY
Qty: 7 TABLET | Refills: 0 | Status: SHIPPED | OUTPATIENT
Start: 2020-02-17 | End: 2020-02-24

## 2020-02-17 RX ORDER — METRONIDAZOLE 500 MG/1
500 TABLET ORAL 2 TIMES DAILY
Qty: 14 TABLET | Refills: 0 | Status: SHIPPED | OUTPATIENT
Start: 2020-02-17 | End: 2020-02-24

## 2020-02-17 NOTE — TELEPHONE ENCOUNTER
Patient calls, complains of white vaginal discharge with itching. Message routed to DR for further action.

## 2020-06-03 ENCOUNTER — OFFICE VISIT (OUTPATIENT)
Dept: OBGYN CLINIC | Age: 55
End: 2020-06-03
Payer: COMMERCIAL

## 2020-06-03 VITALS
SYSTOLIC BLOOD PRESSURE: 125 MMHG | HEART RATE: 107 BPM | BODY MASS INDEX: 29.39 KG/M2 | TEMPERATURE: 97.3 F | WEIGHT: 171.2 LBS | RESPIRATION RATE: 16 BRPM | DIASTOLIC BLOOD PRESSURE: 84 MMHG

## 2020-06-03 PROBLEM — N76.0 VULVOVAGINITIS: Status: ACTIVE | Noted: 2020-06-03

## 2020-06-03 PROCEDURE — 99213 OFFICE O/P EST LOW 20 MIN: CPT | Performed by: SPECIALIST

## 2020-06-03 PROCEDURE — G8417 CALC BMI ABV UP PARAM F/U: HCPCS | Performed by: SPECIALIST

## 2020-06-03 PROCEDURE — 3017F COLORECTAL CA SCREEN DOC REV: CPT | Performed by: SPECIALIST

## 2020-06-03 PROCEDURE — 4004F PT TOBACCO SCREEN RCVD TLK: CPT | Performed by: SPECIALIST

## 2020-06-03 PROCEDURE — G8427 DOCREV CUR MEDS BY ELIG CLIN: HCPCS | Performed by: SPECIALIST

## 2020-06-03 RX ORDER — FLUCONAZOLE 100 MG/1
100 TABLET ORAL DAILY
Qty: 7 TABLET | Refills: 0 | Status: SHIPPED | OUTPATIENT
Start: 2020-06-03 | End: 2020-06-10

## 2020-06-03 RX ORDER — CLOTRIMAZOLE AND BETAMETHASONE DIPROPIONATE 10; .64 MG/G; MG/G
CREAM TOPICAL
Qty: 1 TUBE | Refills: 1 | Status: SHIPPED | OUTPATIENT
Start: 2020-06-03 | End: 2021-02-02

## 2020-06-03 RX ORDER — METRONIDAZOLE 500 MG/1
500 TABLET ORAL 2 TIMES DAILY
Qty: 14 TABLET | Refills: 0 | Status: SHIPPED | OUTPATIENT
Start: 2020-06-03 | End: 2020-06-10

## 2020-06-03 ASSESSMENT — ENCOUNTER SYMPTOMS
VOMITING: 0
COUGH: 0
DIARRHEA: 0
ABDOMINAL PAIN: 0
EYE PAIN: 0
APNEA: 0
ABDOMINAL DISTENTION: 0
NAUSEA: 0
CONSTIPATION: 0

## 2020-06-03 NOTE — PROGRESS NOTES
Rash present. Left: Rash present. Vagina: Vaginal discharge present. Comments: Irritation noted on bilateral labia majora and minora close to the clitoris  Musculoskeletal: Normal range of motion. Skin:     General: Skin is dry. Neurological:      Mental Status: She is alert and oriented to person, place, and time. Psychiatric:         Behavior: Behavior normal.         Thought Content: Thought content normal.         Assessment:      Patient with vulvovaginitis. Will treat with Flagyl and Diflucan. Patient was also advised to apply Lotrizone vaginal cream on the areas of outside itching. Patient advised to schedule an appointment for annual exam.  Patient was advised of information received from the task force regarding Covid-19. Patient was told to use some kind of mouth and nose covering mask when she does need to leave her home. Plan:      Orders Placed This Encounter   Medications    clotrimazole-betamethasone (LOTRISONE) 1-0.05 % cream     Sig: Apply topically 2 times daily. Dispense:  1 Tube     Refill:  1    metroNIDAZOLE (FLAGYL) 500 MG tablet     Sig: Take 1 tablet by mouth 2 times daily for 7 days     Dispense:  14 tablet     Refill:  0    fluconazole (DIFLUCAN) 100 MG tablet     Sig: Take 1 tablet by mouth daily for 7 days     Dispense:  7 tablet     Refill:  0      Appointment as needed. Gaudencio Valerio am scribing for, and in the presence of Dr. Lucrecia Chiang. Electronically signed by: Abhilash Solano 6/3/20 2:57 PM       I agree to the above documentation placed by my scribe Abhilash Solano. I reviewed the scribe's note and agree with the documented findings and plan of care. Any areas of disagreement are noted on the chart. I have personally evaluated this patient. Additional findings are as noted.  I agree with the chief complaint, past medical history, past surgical history, allergies, medications, social and family history as documented unless otherwise noted below.      Electronically signed by Terry Miranda MD on 6/4/2020 at 8:51 AM

## 2021-02-02 ENCOUNTER — OFFICE VISIT (OUTPATIENT)
Dept: OBGYN CLINIC | Age: 56
End: 2021-02-02
Payer: COMMERCIAL

## 2021-02-02 ENCOUNTER — HOSPITAL ENCOUNTER (OUTPATIENT)
Age: 56
Setting detail: SPECIMEN
Discharge: HOME | End: 2021-02-02
Payer: COMMERCIAL

## 2021-02-02 VITALS
HEIGHT: 64 IN | SYSTOLIC BLOOD PRESSURE: 136 MMHG | BODY MASS INDEX: 28.51 KG/M2 | DIASTOLIC BLOOD PRESSURE: 92 MMHG | HEART RATE: 82 BPM | WEIGHT: 167 LBS

## 2021-02-02 DIAGNOSIS — Z01.419 PAP SMEAR, AS PART OF ROUTINE GYNECOLOGICAL EXAMINATION: Primary | ICD-10-CM

## 2021-02-02 DIAGNOSIS — Z11.51 SCREENING FOR HUMAN PAPILLOMAVIRUS: ICD-10-CM

## 2021-02-02 DIAGNOSIS — Z12.31 ENCOUNTER FOR SCREENING MAMMOGRAM FOR BREAST CANCER: ICD-10-CM

## 2021-02-02 DIAGNOSIS — N76.0 ACUTE VAGINITIS: ICD-10-CM

## 2021-02-02 PROCEDURE — G8484 FLU IMMUNIZE NO ADMIN: HCPCS | Performed by: SPECIALIST

## 2021-02-02 PROCEDURE — G0101 CA SCREEN;PELVIC/BREAST EXAM: HCPCS | Performed by: SPECIALIST

## 2021-02-02 RX ORDER — METRONIDAZOLE 500 MG/1
500 TABLET ORAL 2 TIMES DAILY
Qty: 14 TABLET | Refills: 0 | Status: SHIPPED | OUTPATIENT
Start: 2021-02-02 | End: 2021-02-09

## 2021-02-02 RX ORDER — CLOTRIMAZOLE AND BETAMETHASONE DIPROPIONATE 10; .64 MG/G; MG/G
CREAM TOPICAL
Qty: 45 G | Refills: 1 | Status: SHIPPED | OUTPATIENT
Start: 2021-02-02

## 2021-02-02 RX ORDER — FLUCONAZOLE 100 MG/1
100 TABLET ORAL DAILY
Qty: 7 TABLET | Refills: 0 | Status: SHIPPED | OUTPATIENT
Start: 2021-02-02 | End: 2021-02-09

## 2021-02-02 ASSESSMENT — ENCOUNTER SYMPTOMS
NAUSEA: 0
VOMITING: 0
APNEA: 0
EYE PAIN: 0
COUGH: 0
DIARRHEA: 0
ABDOMINAL PAIN: 0
ABDOMINAL DISTENTION: 0
CONSTIPATION: 0

## 2021-02-02 NOTE — PROGRESS NOTES
Subjective:      Patient ID: Elinor Meier is a 60 y.o. female. Chief Complaint   Patient presents with    Annual Exam     BP (!) 136/92 (Site: Left Upper Arm, Position: Sitting, Cuff Size: Medium Adult)   Pulse 82   Ht 5' 4\" (1.626 m)   Wt 167 lb (75.8 kg)   LMP 07/10/2012   Breastfeeding No   BMI 28.67 kg/m²   Patient's last menstrual period was 07/10/2012.     Past Medical History:   Diagnosis Date    Acid reflux disease     Anxiety     Arthralgia     Bipolar disorder ([facility] [address]     pt denies    Brachial neuritis or radiculitis NOS 10/23/2012    Cigarette smoker     Constipation     Degeneration of cervical intervertebral disc 10/23/2012    Depression     Essential hypertension 2018    Fibroid uterus     H/O hypokalemia     Hiatal hernia     History of blood transfusion     after     Hyperlipidemia     borderline, no medications currently    Mass     rt elbow    Periodic limb movement disorder     Post-menopausal bleeding     Sleep apnea     has machine, does not use    Substance abuse ([facility])     H/O alcohol, crack cocaine, cannabis--in remission since     Tachycardia     Urinary leakage     Wears glasses     Wears partial dentures     upper and lower     Current Outpatient Medications Ordered in Epic   Medication Sig Dispense Refill    metroNIDAZOLE (FLAGYL) 500 MG tablet Take 1 tablet by mouth 2 times daily for 7 days 14 tablet 0    fluconazole (DIFLUCAN) 100 MG tablet Take 1 tablet by mouth daily for 7 days 7 tablet 0    clotrimazole-betamethasone (LOTRISONE) 1-0.05 % cream Apply topically 2 times daily.  45 g 1    ibuprofen (ADVIL;MOTRIN) 600 MG tablet Take 1 tablet by mouth every 6 hours as needed for Pain 15 tablet 0    loratadine (CLARITIN) 10 MG capsule Take 10 mg by mouth daily      betamethasone valerate (VALISONE) 0.1 % ointment Apply topically      PARoxetine (PAXIL CR) 25 MG extended release tablet take 1 tablet by mouth once daily  0  lidocaine-prilocaine (EMLA) 2.5-2.5 % cream Apply 1 each topically as needed for Pain (foot)       amLODIPine (NORVASC) 10 MG tablet Take 10 mg by mouth      RA VITAMIN D-3 5000 units CAPS capsule take 1 capsule by mouth once daily  0    clonazePAM (KLONOPIN) 0.5 MG tablet Take 1 tablet by mouth. Indications: anxiety       fluticasone (FLONASE) 50 MCG/ACT nasal spray 1 spray by Nasal route daily   0    RA LAXATIVE powder MIX 1 BOTTLE WITH 64 OUNCE OF GATORADE OR PROPEL WATER AND DRINK OVER 12 TO 24 HOURS prn  0    potassium chloride 20 MEQ/15ML (10%) oral solution Take 15 mLs by mouth daily Take 1 dose daily for 2 days. 30 mL 0    valACYclovir (VALTREX) 500 MG tablet take 1 tablet by mouth twice a day 30 tablet 0    traZODone (DESYREL) 50 MG tablet Take 50 mg by mouth nightly      Sucralfate (CARAFATE PO) Take by mouth      cyclobenzaprine (FLEXERIL) 10 MG tablet Take 10 mg by mouth 3 times daily as needed for Muscle spasms      omeprazole (PRILOSEC) 40 MG delayed release capsule Take 40 mg by mouth 2 times daily   0     No current [facility]-ordered facility-administered medications on file.       Problem List Items Addressed This Visit     None      Visit Diagnoses     Pap smear, as part of routine gynecological examination    -  Primary    Relevant Orders    PAP SMEAR    Screening for human papillomavirus        Relevant Orders    PAP SMEAR    Encounter for screening mammogram for breast cancer        Relevant Orders    OSMMER DIGITAL SCREEN W OR WO CAD BILATERAL    Acute vaginitis            Allergies   Allergen Reactions    Demeclocycline Swelling     Throat swelling    Sertraline Rash    Lisinopril Other (See Comments)     Cough    Pravastatin Other (See Comments)     Muscle cramps    Seasonal     Tetracyclines & Related Swelling     Throat swelling    Bactrim Nausea And Vomiting     Orders Placed This Encounter   Procedures    SOMMER DIGITAL SCREEN W OR WO CAD BILATERAL     Standing Status:   Future Standing Expiration Date:   2022     Order Specific Question:   Reason for exam:     Answer:   screen for breast cancer    PAP SMEAR     Patient History:    Patient's last menstrual period was 07/10/2012. OBGYN Status: Postmenopausal  Past Surgical History:  : BREAST BIOPSY      Comment:  left breast - benign  2013: CERVICAL SPINE SURGERY      Comment:  C6 C7 fusion   1989:  SECTION  No date: COLONOSCOPY      Comment:  removal 2 polyps   2011 -- 12: DILATION AND CURETTAGE  7/10/2019: DILATION AND CURETTAGE OF UTERUS; N/A      Comment:  HYSTEROSCOPY D & C performed by Ervin Holbrook MD at               [address]: [address]:  left tube remains  11---   12: HYSTEROSCOPY      Comment:  both with D&C  1984: INDUCED   2000: TUBAL LIGATION  No date: UPPER GASTROINTESTINAL ENDOSCOPY  1987: [address]: VAGINA SURGERY      Comment:  vaginal/rectum fistulum repair      Social History    Tobacco Use      Smoking status: Current Every Day Smoker        Packs/day: 0.50        Years: 20.00        Pack years: 10        Types: Cigarettes      Smokeless tobacco: Never Used       Standing Status:   Future     Standing Expiration Date:   2022     Order Specific Question:   Collection Type     Answer: Thin Prep     Order Specific Question:   Prior Abnormal Pap Test     Answer:   No     Order Specific Question:   Screening or Diagnostic     Answer:   Screening     Order Specific Question:   HPV Requested? Answer:   Yes     Order Specific Question:   High Risk Patient     Answer:   N/A        Patient is here for an annual exam today. Her last period was in . She is postmenopausal.  She states that she has been having a vaginal discharge. She also has vaginal itching. Review of Systems   Constitutional: Negative for activity change, appetite change and fever. HENT: Negative for ear discharge and ear pain. Eyes: Negative for pain and visual disturbance. Respiratory: Negative for apnea and cough. Cardiovascular: Negative for chest pain, palpitations and leg swelling. Gastrointestinal: Negative for abdominal distention, abdominal pain, constipation, diarrhea, nausea and vomiting. Endocrine: Negative. Genitourinary: Positive for vaginal discharge (with itching). Negative for difficulty urinating, dysuria, menstrual problem and pelvic pain. Musculoskeletal: Negative for neck pain and neck stiffness. Skin: Negative. Neurological: Negative for light-headedness and numbness. Hematological: Negative. Does not bruise/bleed easily. Objective:   Physical Exam  Vitals signs and nursing note reviewed. Exam conducted with a chaperone present. Constitutional:       Appearance: She is well-developed. HENT:      Head: Normocephalic and atraumatic. Neck:      Thyroid: No thyroid mass or thyromegaly. Cardiovascular:      Rate and Rhythm: Normal rate and regular rhythm. Pulmonary:      Effort: Pulmonary effort is normal.      Breath sounds: Normal breath sounds. Chest:      Breasts:         Right: Normal. No inverted nipple, mass, nipple discharge, skin change or tenderness. Left: Normal. No inverted nipple, mass, nipple discharge, skin change or tenderness. Abdominal:      General: Bowel sounds are normal. There is no distension. Palpations: Abdomen is soft. There is no mass. Tenderness: There is no abdominal tenderness. There is no guarding or rebound. Hernia: There is no hernia in the left inguinal area. Genitourinary:     General: Normal vulva. Exam position: Supine. Labia:         Right: No rash or lesion. Left: No rash or lesion. Vagina: Normal. No signs of injury. No vaginal discharge or tenderness. Cervix: No cervical motion tenderness or discharge. Uterus: Normal. Not enlarged, not fixed and not tender. Adnexa: Right adnexa normal and left adnexa normal.        Right: No mass or tenderness. Left: No mass or tenderness. Rectum: Normal. No mass or anal fissure. Normal anal tone. Musculoskeletal: Normal range of motion. General: No tenderness. Skin:     General: Skin is warm and dry. Neurological:      Mental Status: She is alert and oriented to person, place, and time. Psychiatric:         Judgment: Judgment normal.         Assessment:      Patient with vaginitis, otherwise normal annual exam.  Pap smear was done. Will treat with Flagyl and Diflucan. Patient was also told to use Lotrisone cream externally for itching. Patient was advised to have mammogram done. Plan:      Orders Placed This Encounter   Procedures    SOMMER DIGITAL SCREEN W OR WO CAD BILATERAL    PAP SMEAR     Orders Placed This Encounter   Medications    metroNIDAZOLE (FLAGYL) 500 MG tablet     Sig: Take 1 tablet by mouth 2 times daily for 7 days     Dispense:  14 tablet     Refill:  0    fluconazole (DIFLUCAN) 100 MG tablet     Sig: Take 1 tablet by mouth daily for 7 days     Dispense:  7 tablet     Refill:  0    clotrimazole-betamethasone (LOTRISONE) 1-0.05 % cream     Sig: Apply topically 2 times daily. Dispense:  45 g     Refill:  1      Appointment in 1 year. Jarvis Lane am scribing for, and in the presence of Dr. Liliane Wang. Electronically signed by: Jaquelin Hamlin 2/2/21 11:46 AM       I agree to the above documentation placed by my scribe Jaquelin Hamlin. I reviewed the scribe's note and agree with the documented findings and plan of care. Any areas of disagreement are noted on the chart. I have personally evaluated this patient. Additional findings are as noted. I agree with the chief complaint, past medical history, past surgical history, allergies, medications, social and family history as documented unless otherwise noted below. Electronically signed by Beulah Hale MD on 2/3/2021 at 1:25 AM

## 2021-02-03 PROBLEM — Z12.31 ENCOUNTER FOR SCREENING MAMMOGRAM FOR BREAST CANCER: Status: ACTIVE | Noted: 2021-02-03

## 2021-02-03 PROBLEM — Z11.51 SCREENING FOR HUMAN PAPILLOMAVIRUS: Status: ACTIVE | Noted: 2021-02-03

## 2021-02-03 PROBLEM — Z01.419 PAP SMEAR, AS PART OF ROUTINE GYNECOLOGICAL EXAMINATION: Status: ACTIVE | Noted: 2021-02-03

## 2021-02-04 LAB
HPV SAMPLE: NORMAL
HPV, GENOTYPE 16: NOT DETECTED
HPV, GENOTYPE 18: NOT DETECTED
HPV, HIGH RISK OTHER: NOT DETECTED
HPV, INTERPRETATION: NORMAL
SPECIMEN DESCRIPTION: NORMAL

## 2021-02-11 LAB — CYTOLOGY REPORT: NORMAL

## 2021-03-05 PROBLEM — Z01.419 PAP SMEAR, AS PART OF ROUTINE GYNECOLOGICAL EXAMINATION: Status: RESOLVED | Noted: 2021-02-03 | Resolved: 2021-03-05

## 2021-03-05 PROBLEM — Z12.31 ENCOUNTER FOR SCREENING MAMMOGRAM FOR BREAST CANCER: Status: RESOLVED | Noted: 2021-02-03 | Resolved: 2021-03-05

## 2022-03-14 ENCOUNTER — OFFICE VISIT (OUTPATIENT)
Dept: OBGYN CLINIC | Age: 57
End: 2022-03-14
Payer: COMMERCIAL

## 2022-03-14 ENCOUNTER — HOSPITAL ENCOUNTER (OUTPATIENT)
Age: 57
Setting detail: SPECIMEN
Discharge: HOME | End: 2022-03-14

## 2022-03-14 VITALS
SYSTOLIC BLOOD PRESSURE: 114 MMHG | BODY MASS INDEX: 26.8 KG/M2 | HEART RATE: 80 BPM | HEIGHT: 64 IN | WEIGHT: 157 LBS | DIASTOLIC BLOOD PRESSURE: 74 MMHG

## 2022-03-14 DIAGNOSIS — Z01.419 WELL WOMAN EXAM WITH ROUTINE GYNECOLOGICAL EXAM: Primary | ICD-10-CM

## 2022-03-14 DIAGNOSIS — N76.0 ACUTE VAGINITIS: ICD-10-CM

## 2022-03-14 DIAGNOSIS — Z11.59 ENCOUNTER FOR SCREENING FOR OTHER VIRAL DISEASES: ICD-10-CM

## 2022-03-14 DIAGNOSIS — R10.9 LEFT FLANK PAIN: ICD-10-CM

## 2022-03-14 DIAGNOSIS — R63.4 ABNORMAL WEIGHT LOSS: ICD-10-CM

## 2022-03-14 DIAGNOSIS — Z12.31 ENCOUNTER FOR SCREENING MAMMOGRAM FOR BREAST CANCER: ICD-10-CM

## 2022-03-14 LAB
BILIRUBIN, POC: NORMAL
BLOOD URINE, POC: NORMAL
CLARITY, POC: CLEAR
COLOR, POC: YELLOW
GLUCOSE URINE, POC: NORMAL
KETONES, POC: NORMAL
LEUKOCYTE EST, POC: NORMAL
NITRITE, POC: NORMAL
PH, POC: 6
PROTEIN, POC: NORMAL
SPECIFIC GRAVITY, POC: 1
UROBILINOGEN, POC: NORMAL

## 2022-03-14 PROCEDURE — 36415 COLL VENOUS BLD VENIPUNCTURE: CPT | Performed by: CLINICAL NURSE SPECIALIST

## 2022-03-14 PROCEDURE — 81003 URINALYSIS AUTO W/O SCOPE: CPT | Performed by: CLINICAL NURSE SPECIALIST

## 2022-03-14 PROCEDURE — G8484 FLU IMMUNIZE NO ADMIN: HCPCS | Performed by: CLINICAL NURSE SPECIALIST

## 2022-03-14 PROCEDURE — G0101 CA SCREEN;PELVIC/BREAST EXAM: HCPCS | Performed by: CLINICAL NURSE SPECIALIST

## 2022-03-14 ASSESSMENT — PATIENT HEALTH QUESTIONNAIRE - PHQ9
SUM OF ALL RESPONSES TO PHQ9 QUESTIONS 1 & 2: 0
SUM OF ALL RESPONSES TO PHQ QUESTIONS 1-9: 0
1. LITTLE INTEREST OR PLEASURE IN DOING THINGS: 0
SUM OF ALL RESPONSES TO PHQ QUESTIONS 1-9: 0
SUM OF ALL RESPONSES TO PHQ QUESTIONS 1-9: 0
2. FEELING DOWN, DEPRESSED OR HOPELESS: 0
SUM OF ALL RESPONSES TO PHQ QUESTIONS 1-9: 0

## 2022-03-14 NOTE — PROGRESS NOTES
Patient was in the office today for a hiv,vdrl,herpes,hep b, hepc. Draw per physician order using sterile technique.   Drawn from the r antecubital.

## 2022-03-14 NOTE — PROGRESS NOTES
[address] [place] [facility]  [address] [facility] [place] [address]  Dept: [phone]        DATE OF VISIT:  3/14/22        History and Physical    Renetta Hernández    :  1965  CHIEF COMPLAINT:    Chief Complaint   Patient presents with    Annual Exam                    Renetta Hernández is a 60 y.o. female who presents for annual well woman exam.  Patient reports that she has had vaginal discharge for the past 2 weeks with odor and itching. Patient is requesting STD screening today. Patient also reports 10 lb weight loss without trying since august.      The patient was seen and examined. Per the patient bowels areregular. She has no voiding complaints. She denies any bloating as well as vaginal discharge. Chaperone for Intimate Exam   Chaperone was offered as part of the rooming process. Patient declined and agrees to continue with exam without a chaperone.    Chaperone: none     _____________________________________________________________________  Past Medical History:   Diagnosis Date    Acid reflux disease     Anxiety     Arthralgia     Bipolar disorder ([facility] [address]     pt denies    Brachial neuritis or radiculitis NOS 10/23/2012    Cigarette smoker     Constipation     Degeneration of cervical intervertebral disc 10/23/2012    Depression     Essential hypertension 2018    Fibroid uterus     H/O hypokalemia     Hiatal hernia     History of blood transfusion     after     Hyperlipidemia     borderline, no medications currently    Mass     rt elbow    Periodic limb movement disorder     Post-menopausal bleeding     Sleep apnea     has machine, does not use    Substance abuse ([facility]CC)     H/O alcohol, crack cocaine, cannabis--in remission since     Tachycardia     Urinary leakage     Wears glasses     Wears partial dentures     upper and lower Past Surgical History:   Procedure Laterality Date    BREAST BIOPSY      left breast - benign    CERVICAL SPINE SURGERY  2013    C6 C7 fusion      SECTION      COLONOSCOPY      removal 2 polyps     DILATION AND CURETTAGE  2011 -- 12    DILATION AND CURETTAGE OF UTERUS N/A 7/10/2019    HYSTEROSCOPY D & C performed by Shamar Huntley MD at [address] [place] [address]    left tube remains    HYSTEROSCOPY  11---   12    both with D&C    INDUCED       TUBAL LIGATION      UPPER GASTROINTESTINAL ENDOSCOPY      URETHRA SURGERY      VAGINA SURGERY      vaginal/rectum fistulum repair     Family History   Problem Relation Age of Onset    Diabetes Paternal Grandmother     Heart Disease Maternal Grandmother     Cancer Father         lung    Heart Disease Mother     Hypertension Mother     Hypertension Brother      Social History     Tobacco Use   Smoking Status Current Every Day Smoker    Packs/day: 0.50    Years: 20.00    Pack years: 10.00    Types: Cigarettes   Smokeless Tobacco Never Used     Social History     Substance and Sexual Activity   Alcohol Use No     Current Outpatient Medications   Medication Sig Dispense Refill    clotrimazole-betamethasone (LOTRISONE) 1-0.05 % cream Apply topically 2 times daily. 45 g 1    loratadine (CLARITIN) 10 MG capsule Take 10 mg by mouth daily      betamethasone valerate (VALISONE) 0.1 % ointment Apply topically      PARoxetine (PAXIL CR) 25 MG extended release tablet take 1 tablet by mouth once daily  0    lidocaine-prilocaine (EMLA) 2.5-2.5 % cream Apply 1 each topically as needed for Pain (foot)       amLODIPine (NORVASC) 10 MG tablet Take 10 mg by mouth      RA VITAMIN D-3 5000 units CAPS capsule take 1 capsule by mouth once daily  0    clonazePAM (KLONOPIN) 0.5 MG tablet Take 1 tablet by mouth.  Indications: anxiety       fluticasone (FLONASE) 50 MCG/ACT nasal spray 1 spray by Nasal route daily   0    RA LAXATIVE powder MIX 1 BOTTLE WITH 64 OUNCE OF GATORADE OR PROPEL WATER AND DRINK OVER 12 TO 24 HOURS prn  0    potassium chloride 20 MEQ/15ML (10%) oral solution Take 15 mLs by mouth daily Take 1 dose daily for 2 days. 30 mL 0    valACYclovir (VALTREX) 500 MG tablet take 1 tablet by mouth twice a day 30 tablet 0    traZODone (DESYREL) 50 MG tablet Take 50 mg by mouth nightly      Sucralfate (CARAFATE PO) Take by mouth      cyclobenzaprine (FLEXERIL) 10 MG tablet Take 10 mg by mouth 3 times daily as needed for Muscle spasms      ibuprofen (ADVIL;MOTRIN) 600 MG tablet Take 1 tablet by mouth every 6 hours as needed for Pain (Patient not taking: Reported on 3/14/2022) 15 tablet 0     No current facility-administered medications for this visit. Allergies: Allergies   Allergen Reactions    Demeclocycline Swelling     Throat swelling    Sertraline Rash    Lisinopril Other (See Comments)     Cough    Pravastatin Other (See Comments)     Muscle cramps    Seasonal     Tetracyclines & Related Swelling     Throat swelling    Bactrim Nausea And Vomiting       Gynecologic History:  Patient's last menstrual period was 07/10/2012.   Sexually Active: Yes  STD History:Yes trich 30 + yrs ago  Birth Control: No    OB History    Para Term  AB Living   7 5 0 0 1 0   SAB IAB Ectopic Molar Multiple Live Births   0 0 1 0 0 0     ______________________________________________________________________    Review of Systems     REVIEW OFSYSTEMS:        Constitutional:  Unexpected weight change lost 10 lbs without trying in the past 6 months and PCP is aware, extreme fatigue, night sweats              yes  Skin:                           Rashes, moles  on face  yes  Neurological:  Frequent headaches twice weekly which is due to neck injury, seizures         yes  Ophthalmic:  Recent visual changes and has appt on  yes  ENT:   Difficulty swallowing Has stricture and has it dialated every 6 months yes  Breast:              Masses, pain, nipple discharge                            no     Respiratory:  Shortness of breath with activity , coughing And is a smoker          yes    Cardiovascular: Chest pain   no     Gastrointestinal: Chronic diarrhea/constipation, nausea/vomiting           yes   Urogenital:  Urinary incontinence with coughing moderate amount, frequency, urgency          yes                                         Heavy/irregular periods           no                                      Vaginal discharge       White             yes  Hematological: Bruises easy Denies clotting disorder  yes     Endocrine:  Hot flashes Night sweats twice weekly or less  yes     Hot/Cold Intolerance  no    Psychological:            Mood and affect were within normal limits. Mood swings and meds working well   yes                 Physical Exam    Physical Exam:    Vitals:    03/14/22 1053   BP: 114/74   Site: Left Upper Arm   Position: Sitting   Cuff Size: Medium Adult   Pulse: 80   Weight: 157 lb (71.2 kg)   Height: 5' 4\" (1.626 m)       General Appearance: This  is a well developed, well nourished, well groomed female. Her BMI was reviewed. Nutritional decision making andexercise were discussed. Neurological:  The patient is alert and oriented to time,place, person, and situation    Skin:  A brief inspection of the skin revealed no rashes or lesions. Neck:  The neck was supple. Respiratory: There was unlabored respiratory effort. Lungs clear to ascultation. Cardiovascular: The patients extremities were without calf tenderness or edema. Heart with a regular rate and rhythm. Abdomen: The abdomen was soft and non-tender with no guarding, rebound or rigidity. No hernias were appreciated. Breast:   The patients breasts were symmetrical.  There were no masses, discharge or retractions noted. Self breast exams were reviewed.     Pelvic Exam:  The external genitalia was with a normal appearance. The vaginal vault was normal. There were no cystocele, rectocele, or enterocele appreciated. There was no vaginal discharge. The cervix was without lesions. There was no cervical motion tenderness. The uterus was mobile, midline and regular. The adnexa no fullness, tenderness or masses appreciated. ASSESSMENT:     Normal annual well woman exam    60 y.o. Female; Annual   Diagnosis Orders   1. Well woman exam with routine gynecological exam  SOMMER DIGITAL SCREEN W OR WO CAD BILATERAL   2. Encounter for screening mammogram for breast cancer  SOMMER DIGITAL SCREEN W OR WO CAD BILATERAL   3. Acute vaginitis  Vaginitis DNA Probe    C.trachomatis N.gonorrhoeae DNA    Hepatitis B Surface Antigen    Hepatitis C Antibody    HERPES PROFILE    HIV Screen    T. pallidum Ab   4. Left flank pain  POCT Urinalysis No Micro (Auto)   5. Encounter for screening for other viral diseases   Hepatitis B Surface Antigen   6. Abnormal weight loss   HIV Screen                     PLAN:  - Pap collected. Discussed new papsmear guidelines. - Smoking risk factors Discussed  - Diet and exercise reviewed. - Routine healthmaintenance per patients PCP.  - Return to clinic in 1 year or earlier with questions, problems, concerns. Return for 1 year for Annual and as needed.         Electronically signed by SUZIE Madrigal CNP on 3/14/2022 at 11:55 AM

## 2022-03-15 DIAGNOSIS — B37.9 CANDIDIASIS: Primary | ICD-10-CM

## 2022-03-15 LAB
C TRACH DNA GENITAL QL NAA+PROBE: NEGATIVE
CANDIDA SPECIES, DNA PROBE: POSITIVE
GARDNERELLA VAGINALIS, DNA PROBE: NEGATIVE
HEPATITIS B SURFACE ANTIGEN: NONREACTIVE
HEPATITIS C ANTIBODY: NONREACTIVE
HERPES SIMPLEX VIRUS 1 IGG: 0.48
HERPES SIMPLEX VIRUS 2 IGG: 7.41
HERPES TYPE 1/2 IGM COMBINED: 0.21
HIV AG/AB: NONREACTIVE
N. GONORRHOEAE DNA: NEGATIVE
SOURCE: ABNORMAL
SPECIMEN DESCRIPTION: NORMAL
T. PALLIDUM, IGG: NONREACTIVE
TRICHOMONAS VAGINALIS DNA: NEGATIVE

## 2022-03-15 RX ORDER — FLUCONAZOLE 150 MG/1
TABLET ORAL
Qty: 3 TABLET | Refills: 0 | Status: SHIPPED | OUTPATIENT
Start: 2022-03-15

## 2022-03-15 RX ORDER — VALACYCLOVIR HYDROCHLORIDE 1 G/1
1000 TABLET, FILM COATED ORAL DAILY
Qty: 5 TABLET | Refills: 5 | Status: SHIPPED | OUTPATIENT
Start: 2022-03-15 | End: 2022-03-20

## 2024-04-04 ENCOUNTER — TELEPHONE (OUTPATIENT)
Dept: OBGYN CLINIC | Age: 59
End: 2024-04-04

## 2025-03-31 ENCOUNTER — OFFICE VISIT (OUTPATIENT)
Dept: ORTHOPEDIC SURGERY | Age: 60
End: 2025-03-31
Payer: MEDICAID

## 2025-03-31 VITALS — HEIGHT: 64 IN | BODY MASS INDEX: 27.66 KG/M2 | WEIGHT: 162 LBS | RESPIRATION RATE: 14 BRPM

## 2025-03-31 DIAGNOSIS — M25.511 RIGHT SHOULDER PAIN, UNSPECIFIED CHRONICITY: Primary | ICD-10-CM

## 2025-03-31 PROCEDURE — 4004F PT TOBACCO SCREEN RCVD TLK: CPT | Performed by: ORTHOPAEDIC SURGERY

## 2025-03-31 PROCEDURE — G8419 CALC BMI OUT NRM PARAM NOF/U: HCPCS | Performed by: ORTHOPAEDIC SURGERY

## 2025-03-31 PROCEDURE — 99213 OFFICE O/P EST LOW 20 MIN: CPT | Performed by: ORTHOPAEDIC SURGERY

## 2025-03-31 PROCEDURE — 3017F COLORECTAL CA SCREEN DOC REV: CPT | Performed by: ORTHOPAEDIC SURGERY

## 2025-03-31 PROCEDURE — G8428 CUR MEDS NOT DOCUMENT: HCPCS | Performed by: ORTHOPAEDIC SURGERY

## 2025-03-31 RX ORDER — DICLOFENAC SODIUM 75 MG/1
75 TABLET, DELAYED RELEASE ORAL 2 TIMES DAILY WITH MEALS
Qty: 28 TABLET | Refills: 0 | Status: SHIPPED | OUTPATIENT
Start: 2025-03-31 | End: 2025-04-14

## 2025-03-31 NOTE — PROGRESS NOTES
Orthopedic Shoulder Encounter Note     Chief complaint: right shoulder pain    HPI: Arlet Bustos is a 59 y.o.  right-hand dominant female who presents for evaluation of her right shoulder.  She has been dealing with pain in the right shoulder.  For the past 8 months or so.  She essentially woke up with pain in the shoulder.  No precipitating trauma or injury but her pain has progressively gotten worse up until recently when she started doing home stretching strengthening program and started taking over-the-counter Motrin.  Since then she has seen significant improvement in pain and function.  Prior to this she states that she was seen by her rheumatologist and received a cortisone injection which made her pain worse.  She was then placed on gabapentin without any improvement.  She localizes her pain to being deep within the joint extending distally across the biceps usually worse at night.    Previous treatment:    NSAIDs: Motrin    Physical Therapy: Home exercise program    Injections: Right shoulder cortisone injection approximately 2 months ago administered by her rheumatologist    Surgeries: None    Review of Systems:   Constitutional: Negative for fever, chills, sweats.   Pain Score:  10 - Worst pain ever  Neurological: Negative for headache, numbness, or weakness.   Musculoskeletal: As noted in HPI     Past Medical History  Arlet  has a past medical history of Acid reflux disease, Anxiety, Arthralgia, Bipolar disorder (HCC), Brachial neuritis or radiculitis NOS, Cigarette smoker, Constipation, Degeneration of cervical intervertebral disc, Depression, Essential hypertension, Fibroid uterus, H/O hypokalemia, Hiatal hernia, History of blood transfusion, Hyperlipidemia, Mass, Periodic limb movement disorder, Post-menopausal bleeding, Sleep apnea, Substance abuse (HCC), Tachycardia, Urinary leakage, Wears glasses, and Wears partial dentures.    Past Surgical History  Arlet  has a past surgical history that

## 2025-04-14 RX ORDER — DICLOFENAC SODIUM 75 MG/1
TABLET, DELAYED RELEASE ORAL
Qty: 28 TABLET | Refills: 0 | OUTPATIENT
Start: 2025-04-14

## 2025-06-10 DIAGNOSIS — M25.511 RIGHT SHOULDER PAIN, UNSPECIFIED CHRONICITY: Primary | ICD-10-CM

## 2025-06-10 NOTE — TELEPHONE ENCOUNTER
Pt called in requesting a refill on the following medication    diclofenac (VOLTAREN) 75 MG EC tablet      Please send to      [id number] - [place], [place] - [address] - P [phone] - F [phone]         Please call pt with any questions @ [phone]

## 2025-06-11 RX ORDER — DICLOFENAC SODIUM 75 MG/1
75 TABLET, DELAYED RELEASE ORAL 2 TIMES DAILY WITH MEALS
Qty: 28 TABLET | Refills: 0 | Status: SHIPPED | OUTPATIENT
Start: 2025-06-11 | End: 2025-06-25

## 2025-07-17 ENCOUNTER — OFFICE VISIT (OUTPATIENT)
Age: 60
End: 2025-07-17

## 2025-07-17 VITALS
HEIGHT: 64 IN | OXYGEN SATURATION: 98 % | DIASTOLIC BLOOD PRESSURE: 84 MMHG | WEIGHT: 174 LBS | HEART RATE: 90 BPM | TEMPERATURE: 97.4 F | SYSTOLIC BLOOD PRESSURE: 123 MMHG | RESPIRATION RATE: 18 BRPM | BODY MASS INDEX: 29.71 KG/M2

## 2025-07-17 DIAGNOSIS — J01.40 ACUTE NON-RECURRENT PANSINUSITIS: Primary | ICD-10-CM

## 2025-07-17 DIAGNOSIS — R51.9 SINUS HEADACHE: ICD-10-CM

## 2025-07-17 RX ORDER — FLUTICASONE PROPIONATE 50 MCG
1 SPRAY, SUSPENSION (ML) NASAL DAILY
Qty: 32 G | Refills: 1 | Status: SHIPPED | OUTPATIENT
Start: 2025-07-17

## 2025-07-17 RX ORDER — AMOXICILLIN 875 MG/1
875 TABLET, COATED ORAL 2 TIMES DAILY
Qty: 14 TABLET | Refills: 0 | Status: SHIPPED | OUTPATIENT
Start: 2025-07-17 | End: 2025-07-24

## 2025-07-17 RX ORDER — CETIRIZINE HYDROCHLORIDE 10 MG/1
10 TABLET ORAL DAILY
Qty: 30 TABLET | Refills: 0 | Status: SHIPPED | OUTPATIENT
Start: 2025-07-17

## 2025-07-17 ASSESSMENT — ENCOUNTER SYMPTOMS
NAUSEA: 0
VOMITING: 0
SORE THROAT: 0
ABDOMINAL PAIN: 0
COUGH: 0
SINUS PRESSURE: 1
WHEEZING: 0
SHORTNESS OF BREATH: 0
TROUBLE SWALLOWING: 0
SINUS PAIN: 1
BLOOD IN STOOL: 0
HOARSE VOICE: 0
DIARRHEA: 0
CONSTIPATION: 0
SWOLLEN GLANDS: 1
FACIAL SWELLING: 0

## 2025-07-17 NOTE — PROGRESS NOTES
Arlet Bustos (: 1965) is a 59 y.o. female, New patient, here for evaluation of the following       Chief complaint(s): Sinusitis and Headache        HPI    History provided by:  Patient   used: No    Sinusitis  This is a new problem. Episode onset: 2-3 WEEKS AGO. The problem has been waxing and waning since onset. There has been no fever. The pain is mild. Associated symptoms include headaches, sinus pressure and swollen glands. Pertinent negatives include no chills, congestion, coughing, diaphoresis, ear pain, hoarse voice, neck pain, shortness of breath, sneezing or sore throat. Treatments tried: SUDAFED. The treatment provided significant relief.   Headache  Headache pattern:  Headache sometimes there, sometimes not at all  Initial event:  Illness  Other illness details:  STATES THAT HER PCP THOUGHT IT MAY BE RELATED TO HER BP, BUT THAT SHE TAKES IT AT HOME AND IT HAS BEEN RUNNING NORMAL, STATES THAT SHE THOUGHT IT MAY BE RELATED TO A SINUS ISSUE  Providers seen:  None  Number of ER visits for headache:  1  Did ER treatment alleviate headache symptoms?:  Yes  Number of hospitalizations for headaches:  0  Time of day symptoms are worse:  No specific time of day  Do headaches wake patient from sleep?: No    Days of the week symptoms are worse:  No specific day of the week  Pain quality: ACHING.  Location:  Behind eyes  Pain severity:  6  Headaches last more than three days?: Yes    Aggravating factors:  None  Allodynia triggers:  None  Changes in thinking and mood:  None  Changes in vision:  None  Bilateral symptoms:  None  Unilateral symptoms:  None  Stomach/GI changes:  None  Changes in sensation:  None  STATES THAT IT IS RELIEVED BY EXCEDRIN AT HOME. STATES THAT SHE HAS CONTINUOUSLY HAD POSTNASAL DRIP AND LEFT MAXILLARY SINUS PRESSURE SINCE THIS STARTED. STATES THAT HER BP CONTINUES TO BE CONTROLLED WHEN CHECKING AT HOME.        PAST MEDICAL HISTORY    Past Medical History:

## 2025-08-03 DIAGNOSIS — M25.511 RIGHT SHOULDER PAIN, UNSPECIFIED CHRONICITY: ICD-10-CM

## 2025-08-08 RX ORDER — DICLOFENAC SODIUM 75 MG/1
TABLET, DELAYED RELEASE ORAL
Qty: 28 TABLET | Refills: 0 | OUTPATIENT
Start: 2025-08-08

## (undated) DEVICE — Z INACTIVE USE 2660664 SOLUTION IRRIG 3000ML 0.9% SOD CHL USP UROMATIC PLAS CONT

## (undated) DEVICE — 3000ML,PRESSURE INFUSER W/STOPCOCK: Brand: MEDLINE

## (undated) DEVICE — LEGGINGS, PAIR, CLEAR, STERILE: Brand: MEDLINE

## (undated) DEVICE — SINGLE PORT MANIFOLD: Brand: NEPTUNE 2

## (undated) DEVICE — ST CHARLES PERI-GYN PACK: Brand: MEDLINE INDUSTRIES, INC.

## (undated) DEVICE — CYSTO/BLADDER IRRIGATION SET, REGULATING CLAMP

## (undated) DEVICE — GLOVE SURG BEAD CUF 7 STD PF WHT STRL TRIUMPH LT LTX

## (undated) DEVICE — GOWN,AURORA,NONREINFORCED,LARGE: Brand: MEDLINE

## (undated) DEVICE — DEVICE TISS REM DIA3MM L25.25IN ENDOSCP F/ IU POLYPS

## (undated) DEVICE — STRAP,POSITIONING,KNEE/BODY,FOAM,4X60": Brand: MEDLINE